# Patient Record
Sex: FEMALE | Race: WHITE | NOT HISPANIC OR LATINO | Employment: OTHER | URBAN - METROPOLITAN AREA
[De-identification: names, ages, dates, MRNs, and addresses within clinical notes are randomized per-mention and may not be internally consistent; named-entity substitution may affect disease eponyms.]

---

## 2017-02-07 ENCOUNTER — COMMUNICATION - HEALTHEAST (OUTPATIENT)
Dept: FAMILY MEDICINE | Facility: CLINIC | Age: 72
End: 2017-02-07

## 2017-02-07 DIAGNOSIS — F41.9 ANXIETY: ICD-10-CM

## 2017-05-17 ENCOUNTER — OFFICE VISIT - HEALTHEAST (OUTPATIENT)
Dept: FAMILY MEDICINE | Facility: CLINIC | Age: 72
End: 2017-05-17

## 2017-05-17 DIAGNOSIS — E03.9 HYPOTHYROIDISM: ICD-10-CM

## 2017-05-17 DIAGNOSIS — G47.00 INSOMNIA, UNSPECIFIED: ICD-10-CM

## 2017-05-17 DIAGNOSIS — F41.9 ANXIETY: ICD-10-CM

## 2017-05-17 DIAGNOSIS — R73.01 IMPAIRED FASTING GLUCOSE: ICD-10-CM

## 2017-05-17 LAB — HBA1C MFR BLD: 6.1 % (ref 3.5–6)

## 2017-05-17 ASSESSMENT — MIFFLIN-ST. JEOR: SCORE: 1105.6

## 2017-05-20 ENCOUNTER — COMMUNICATION - HEALTHEAST (OUTPATIENT)
Dept: FAMILY MEDICINE | Facility: CLINIC | Age: 72
End: 2017-05-20

## 2017-05-20 DIAGNOSIS — E03.9 HYPOTHYROIDISM: ICD-10-CM

## 2017-12-07 ENCOUNTER — AMBULATORY - HEALTHEAST (OUTPATIENT)
Dept: NURSING | Facility: CLINIC | Age: 72
End: 2017-12-07

## 2018-05-09 ENCOUNTER — OFFICE VISIT - HEALTHEAST (OUTPATIENT)
Dept: FAMILY MEDICINE | Facility: CLINIC | Age: 73
End: 2018-05-09

## 2018-05-09 DIAGNOSIS — Z12.11 SCREEN FOR COLON CANCER: ICD-10-CM

## 2018-05-09 DIAGNOSIS — N39.0 URINARY TRACT INFECTION WITHOUT HEMATURIA, SITE UNSPECIFIED: ICD-10-CM

## 2018-05-09 DIAGNOSIS — F41.9 ANXIETY: ICD-10-CM

## 2018-05-09 LAB
ALBUMIN UR-MCNC: NEGATIVE MG/DL
APPEARANCE UR: CLEAR
BACTERIA #/AREA URNS HPF: ABNORMAL HPF
BILIRUB UR QL STRIP: NEGATIVE
COLOR UR AUTO: YELLOW
GLUCOSE UR STRIP-MCNC: NEGATIVE MG/DL
HGB UR QL STRIP: ABNORMAL
KETONES UR STRIP-MCNC: NEGATIVE MG/DL
LEUKOCYTE ESTERASE UR QL STRIP: ABNORMAL
NITRATE UR QL: NEGATIVE
PH UR STRIP: 5.5 [PH] (ref 5–8)
RBC #/AREA URNS AUTO: ABNORMAL HPF
SP GR UR STRIP: 1.01 (ref 1–1.03)
SQUAMOUS #/AREA URNS AUTO: ABNORMAL LPF
UROBILINOGEN UR STRIP-ACNC: ABNORMAL
WBC #/AREA URNS AUTO: ABNORMAL HPF

## 2018-05-09 ASSESSMENT — MIFFLIN-ST. JEOR: SCORE: 1094.26

## 2018-05-10 LAB — BACTERIA SPEC CULT: NO GROWTH

## 2018-10-08 ENCOUNTER — RECORDS - HEALTHEAST (OUTPATIENT)
Dept: ADMINISTRATIVE | Facility: OTHER | Age: 73
End: 2018-10-08

## 2018-10-18 ENCOUNTER — RECORDS - HEALTHEAST (OUTPATIENT)
Dept: ADMINISTRATIVE | Facility: OTHER | Age: 73
End: 2018-10-18

## 2018-10-19 ENCOUNTER — RECORDS - HEALTHEAST (OUTPATIENT)
Dept: ADMINISTRATIVE | Facility: OTHER | Age: 73
End: 2018-10-19

## 2018-11-05 ENCOUNTER — AMBULATORY - HEALTHEAST (OUTPATIENT)
Dept: NURSING | Facility: CLINIC | Age: 73
End: 2018-11-05

## 2019-05-10 ENCOUNTER — RECORDS - HEALTHEAST (OUTPATIENT)
Dept: ADMINISTRATIVE | Facility: OTHER | Age: 74
End: 2019-05-10

## 2019-07-17 ENCOUNTER — OFFICE VISIT - HEALTHEAST (OUTPATIENT)
Dept: FAMILY MEDICINE | Facility: CLINIC | Age: 74
End: 2019-07-17

## 2019-07-17 DIAGNOSIS — Z78.0 ASYMPTOMATIC POSTMENOPAUSAL STATUS: ICD-10-CM

## 2019-07-17 DIAGNOSIS — G47.00 INSOMNIA, UNSPECIFIED TYPE: ICD-10-CM

## 2019-07-17 DIAGNOSIS — E03.9 HYPOTHYROIDISM, UNSPECIFIED TYPE: ICD-10-CM

## 2019-07-17 DIAGNOSIS — D12.6 SERRATED POLYPOSIS SYNDROME: ICD-10-CM

## 2019-07-17 DIAGNOSIS — R73.01 IMPAIRED FASTING GLUCOSE: ICD-10-CM

## 2019-07-17 DIAGNOSIS — Z01.818 PREOP GENERAL PHYSICAL EXAM: ICD-10-CM

## 2019-07-17 LAB
ERYTHROCYTE [DISTWIDTH] IN BLOOD BY AUTOMATED COUNT: 12.7 % (ref 11–14.5)
HBA1C MFR BLD: 6 % (ref 3.5–6)
HCT VFR BLD AUTO: 39.9 % (ref 35–47)
HGB BLD-MCNC: 13.3 G/DL (ref 12–16)
MCH RBC QN AUTO: 30.5 PG (ref 27–34)
MCHC RBC AUTO-ENTMCNC: 33.3 G/DL (ref 32–36)
MCV RBC AUTO: 91 FL (ref 80–100)
PLATELET # BLD AUTO: 278 THOU/UL (ref 140–440)
PMV BLD AUTO: 8.3 FL (ref 7–10)
RBC # BLD AUTO: 4.36 MILL/UL (ref 3.8–5.4)
TSH SERPL DL<=0.005 MIU/L-ACNC: 1.75 UIU/ML (ref 0.3–5)
WBC: 7.9 THOU/UL (ref 4–11)

## 2019-07-17 ASSESSMENT — MIFFLIN-ST. JEOR: SCORE: 1096.08

## 2019-07-18 LAB
ATRIAL RATE - MUSE: 75 BPM
DIASTOLIC BLOOD PRESSURE - MUSE: NORMAL MMHG
INTERPRETATION ECG - MUSE: NORMAL
P AXIS - MUSE: 59 DEGREES
PR INTERVAL - MUSE: 112 MS
QRS DURATION - MUSE: 80 MS
QT - MUSE: 366 MS
QTC - MUSE: 408 MS
R AXIS - MUSE: 41 DEGREES
SYSTOLIC BLOOD PRESSURE - MUSE: NORMAL MMHG
T AXIS - MUSE: 53 DEGREES
VENTRICULAR RATE- MUSE: 75 BPM

## 2019-07-31 ENCOUNTER — OFFICE VISIT - HEALTHEAST (OUTPATIENT)
Dept: FAMILY MEDICINE | Facility: CLINIC | Age: 74
End: 2019-07-31

## 2019-07-31 DIAGNOSIS — D12.6 SERRATED POLYPOSIS SYNDROME: ICD-10-CM

## 2019-07-31 DIAGNOSIS — R19.7 DIARRHEA, UNSPECIFIED TYPE: ICD-10-CM

## 2019-07-31 ASSESSMENT — MIFFLIN-ST. JEOR: SCORE: 1072.04

## 2019-09-30 ENCOUNTER — RECORDS - HEALTHEAST (OUTPATIENT)
Dept: BONE DENSITY | Facility: CLINIC | Age: 74
End: 2019-09-30

## 2019-09-30 ENCOUNTER — RECORDS - HEALTHEAST (OUTPATIENT)
Dept: ADMINISTRATIVE | Facility: OTHER | Age: 74
End: 2019-09-30

## 2019-09-30 DIAGNOSIS — Z78.0 ASYMPTOMATIC MENOPAUSAL STATE: ICD-10-CM

## 2019-10-10 ENCOUNTER — RECORDS - HEALTHEAST (OUTPATIENT)
Dept: ADMINISTRATIVE | Facility: OTHER | Age: 74
End: 2019-10-10

## 2020-01-08 ENCOUNTER — COMMUNICATION - HEALTHEAST (OUTPATIENT)
Dept: FAMILY MEDICINE | Facility: CLINIC | Age: 75
End: 2020-01-08

## 2020-08-06 ENCOUNTER — COMMUNICATION - HEALTHEAST (OUTPATIENT)
Dept: FAMILY MEDICINE | Facility: CLINIC | Age: 75
End: 2020-08-06

## 2020-08-06 DIAGNOSIS — E03.9 HYPOTHYROIDISM, UNSPECIFIED TYPE: ICD-10-CM

## 2020-08-10 ENCOUNTER — COMMUNICATION - HEALTHEAST (OUTPATIENT)
Dept: FAMILY MEDICINE | Facility: CLINIC | Age: 75
End: 2020-08-10

## 2020-08-20 ENCOUNTER — RECORDS - HEALTHEAST (OUTPATIENT)
Dept: ADMINISTRATIVE | Facility: OTHER | Age: 75
End: 2020-08-20

## 2020-10-12 ENCOUNTER — RECORDS - HEALTHEAST (OUTPATIENT)
Dept: ADMINISTRATIVE | Facility: OTHER | Age: 75
End: 2020-10-12

## 2020-10-13 ENCOUNTER — OFFICE VISIT - HEALTHEAST (OUTPATIENT)
Dept: FAMILY MEDICINE | Facility: CLINIC | Age: 75
End: 2020-10-13

## 2020-10-13 DIAGNOSIS — G47.00 INSOMNIA, UNSPECIFIED TYPE: ICD-10-CM

## 2020-10-13 DIAGNOSIS — E03.9 HYPOTHYROIDISM, UNSPECIFIED TYPE: ICD-10-CM

## 2020-10-13 RX ORDER — LEVOTHYROXINE SODIUM 100 UG/1
100 TABLET ORAL DAILY
Qty: 90 TABLET | Refills: 4 | Status: SHIPPED | OUTPATIENT
Start: 2020-10-13 | End: 2021-07-28

## 2020-10-13 RX ORDER — DIAZEPAM 2 MG
TABLET ORAL
Qty: 30 TABLET | Refills: 1 | Status: SHIPPED | OUTPATIENT
Start: 2020-10-13 | End: 2021-10-27

## 2020-10-19 ENCOUNTER — AMBULATORY - HEALTHEAST (OUTPATIENT)
Dept: LAB | Facility: CLINIC | Age: 75
End: 2020-10-19

## 2020-10-19 DIAGNOSIS — E03.9 HYPOTHYROIDISM, UNSPECIFIED TYPE: ICD-10-CM

## 2020-10-19 LAB — TSH SERPL DL<=0.005 MIU/L-ACNC: 1.53 UIU/ML (ref 0.3–5)

## 2021-02-11 ENCOUNTER — AMBULATORY - HEALTHEAST (OUTPATIENT)
Dept: NURSING | Facility: CLINIC | Age: 76
End: 2021-02-11

## 2021-03-04 ENCOUNTER — AMBULATORY - HEALTHEAST (OUTPATIENT)
Dept: NURSING | Facility: CLINIC | Age: 76
End: 2021-03-04

## 2021-03-17 ENCOUNTER — RECORDS - HEALTHEAST (OUTPATIENT)
Dept: ADMINISTRATIVE | Facility: OTHER | Age: 76
End: 2021-03-17

## 2021-05-30 VITALS — WEIGHT: 141 LBS | HEIGHT: 64 IN | BODY MASS INDEX: 24.07 KG/M2

## 2021-05-30 NOTE — PROGRESS NOTES
Preoperative Exam    Scheduled Procedure: Right Hemicolectomy   Surgery Date:  7/25/19  Surgery Location: [facility] Fax# [phone]    Surgeon:  Dr PARIS Park    Assessment/Plan:     1. Preop general physical exam  Surgical risks include mild dyslipidemia that does not require medical treatment and impaired fasting glucose.  At this time she has no conditions that require further evaluation.  She may proceed with surgery as scheduled without further clinical clarification or evaluation and may proceed with choice of anesthesia.  - Electrocardiogram Perform and Read  - HM2(CBC w/o Differential)    2. Serrated polyposis syndrome  To proceed with hemicolectomy as scheduled.    3. Impaired fasting glucose  Encourage continued efforts at healthy lifestyle habits.  Will check A1c today.  She is not fasting.  - Glycosylated Hemoglobin A1c    4. Hypothyroidism, unspecified type  Continue levothyroxine at current dose including day of surgery.  Will check thyroid cascade today.  - levothyroxine (SYNTHROID, LEVOTHROID) 100 MCG tablet; Take 1 tablet (100 mcg total) by mouth Daily at 6:00 am.  Dispense: 90 tablet; Refill: 4  - Thyroid [place]    5. Insomnia, unspecified type  Encouraged continued sleep hygiene.  May use Unisom or diazepam very sparingly, not to combine the 2.  - diazePAM (VALIUM) 2 MG tablet; TAKE ONE TABLET BY MOUTH AT BEDTIME AS NEEDED FOR ANXIETY  Dispense: 30 tablet; Refill: 1     Surgical Procedure Risk: Low (reported cardiac risk generally < 1%)  Have you had prior anesthesia?: Yes  Have you or any family members had a previous anesthesia reaction:  Yes: years ago had a hard time waking up  Do you or any family members have a history of a clotting or bleeding disorder?: No  Cardiac Risk Assessment: no increased risk for major cardiac complications    APPROVAL GIVEN to proceed with proposed procedure, without further diagnostic evaluation    Functional Status: Independent  Patient plans to recover at home  with family.     Subjective:      Nithya Manuel is a 74 y.o. female who presents for a preoperative consultation.  She unfortunately has developed multiple sessile serrated adenomas on 2 colonoscopies within the last year, some of which were unable to be resected in her right colon due to location.  She has since been diagnosed with serrated polyposis syndrome.  Requiring right hemicolectomy.    History of hypothyroidism for which she remains on levothyroxine and clinically is euthyroid, due for thyroid check.  She has a history of impaired fasting glucose with mild elevation in A1c, due for follow-up.  History of mild anxiety and associated insomnia, has diazepam available which she uses very sparingly.  History of mild elevation in lipids which has not required treatment.  She has a history of right ovarian cystectomy, was noted to have many adhesions at the time of that laparoscopic surgery of the laparoscopy was able to be completed.    All other systems reviewed and are negative, other than those listed in the HPI.    Pertinent History  Do you have difficulty breathing or chest pain after walking up a flight of stairs: No  History of obstructive sleep apnea: No  Steroid use in the last 6 months: No  Frequent Aspirin/NSAID use: Yes: aspirin 81mg daily  Prior Blood Transfusion: No  Prior Blood Transfusion Reaction: No  If for some reason prior to, during or after the procedure, if it is medically indicated, would you be willing to have a blood transfusion?:  There is no transfusion refusal.    Current Outpatient Medications   Medication Sig Dispense Refill     diazePAM (VALIUM) 2 MG tablet TAKE ONE TABLET BY MOUTH AT BEDTIME AS NEEDED FOR ANXIETY 30 tablet 1     levothyroxine (SYNTHROID, LEVOTHROID) 100 MCG tablet Take 1 tablet (100 mcg total) by mouth Daily at 6:00 am. 90 tablet 4     No current facility-administered medications for this visit.         Allergies   Allergen Reactions     Penicillins Hives      Sulfa (Sulfonamide Antibiotics) Other (See Comments)     Not sure if its her or her son.       Patient Active Problem List   Diagnosis     Insomnia     Impaired Fasting Glucose     Hypothyroidism     Dyslipidemia     Serrated polyposis syndrome       No past medical history on file.    Past Surgical History:   Procedure Laterality Date     EXPLORATORY LAPAROTOMY      Due to infertility     OVARIAN CYST SURGERY Right 12/26/2013    [facility]     TUBAL LIGATION         Social History     Socioeconomic History     Marital status:      Spouse name: Not on file     Number of children: Not on file     Years of education: Not on file     Highest education level: Not on file   Occupational History     Not on file   Social Needs     Financial resource strain: Not on file     Food insecurity:     Worry: Not on file     Inability: Not on file     Transportation needs:     Medical: Not on file     Non-medical: Not on file   Tobacco Use     Smoking status: Never Smoker     Smokeless tobacco: Never Used   Substance and Sexual Activity     Alcohol use: Yes     Alcohol/week: 1.8 oz     Types: 3 Glasses of wine per week     Drug use: No     Sexual activity: Not on file   Lifestyle     Physical activity:     Days per week: Not on file     Minutes per session: Not on file     Stress: Not on file   Relationships     Social connections:     Talks on phone: Not on file     Gets together: Not on file     Attends Religion service: Not on file     Active member of club or organization: Not on file     Attends meetings of clubs or organizations: Not on file     Relationship status: Not on file     Intimate partner violence:     Fear of current or ex partner: Not on file     Emotionally abused: Not on file     Physically abused: Not on file     Forced sexual activity: Not on file   Other Topics Concern     Not on file   Social History Narrative     Not on file       Patient Care Team:  Kristine Bridges MD as PCP -  "General          Objective:     Vitals:    07/17/19 0941   BP: 138/58   Pulse: 84   Resp: 16   Temp: 98.3  F (36.8  C)   TempSrc: Oral   SpO2: 99%   Weight: 138 lb 14.4 oz (63 kg)   Height: 5' 3.75\" (1.619 m)         Physical Exam:  Physical Examination: General appearance - alert, well appearing, and in no distress, oriented to person, place, and time and normal appearing weight  Mental status - alert, oriented to person, place, and time, normal mood, behavior, speech, dress, motor activity, and thought processes  Eyes - pupils equal and reactive, extraocular eye movements intact  Ears - bilateral TM's and external ear canals normal  Nose - normal and patent, no erythema, discharge or polyps  Mouth - mucous membranes moist, pharynx normal without lesions  Neck - supple, no significant adenopathy  Lymphatics - no palpable lymphadenopathy, no hepatosplenomegaly  Chest - clear to auscultation, no wheezes, rales or rhonchi, symmetric air entry  Heart - normal rate, regular rhythm, normal S1, S2, no murmurs, rubs, clicks or gallops  Abdomen - soft, nontender, nondistended, no masses or organomegaly  Neurological - alert, oriented, normal speech, no focal findings or movement disorder noted  Musculoskeletal - no joint tenderness, deformity or swelling  Extremities - peripheral pulses normal, no pedal edema, no clubbing or cyanosis  Skin - normal coloration and turgor, no rashes, no suspicious skin lesions noted      Patient Instructions     Hold all supplements, aspirin and NSAIDs for 7 days prior to surgery.  Follow your surgeon's direction on when to stop eating and drinking prior to surgery.  Your surgeon will be managing your pain after your surgery.    Remove all jewelry and metal piercings before your surgery.   Remove nail polish from fingers before surgery.    I ordered and personally reviewed a 12-lead EKG which reveals normal sinus rhythm, no ischemia.    Labs:  Labs pending at this time.  Results will be " reviewed when available.    Immunization History   Administered Date(s) Administered     Influenza high dose, seasonal 11/03/2015, 10/17/2016, 12/07/2017, 11/05/2018     Influenza, Seasonal, Inj PF IIV3 12/08/2009, 10/21/2010, 12/01/2011, 11/01/2012, 09/30/2013     Influenza, seasonal,quad inj 6-35 mos 11/01/2008, 12/08/2009, 10/20/2014     Influenza,seasonal, Inj IIV3 11/01/2008, 10/20/2014     Pneumo Conj 13-V (2010&after) 02/26/2015     Pneumo Polysac 23-V 12/08/2009     Td, Adult, Absorbed 04/06/2005     Td,adult,historic,unspecified 04/06/2005     Tdap 12/05/2014     ZOSTER, LIVE 02/04/2016           Electronically signed by Kristine Bridges MD 07/17/19 9:33 AM

## 2021-05-31 ENCOUNTER — RECORDS - HEALTHEAST (OUTPATIENT)
Dept: ADMINISTRATIVE | Facility: CLINIC | Age: 76
End: 2021-05-31

## 2021-05-31 NOTE — PROGRESS NOTES
Assessment/Plan:     1. Serrated polyposis syndrome  Doing quite well postoperatively.  She is a call out to her surgeon in regards to the loose stools.  No indications of underlying infectious process or other complication.  I suspect this is functional and partly related to her lack of need for narcotic pain medications.  Advised compliance with diet as recommended.    2. Diarrhea, unspecified type  As above.      There are no Patient Instructions on file for this visit.     Return in about 6 months (around 1/31/2020) for Annual Wellness Visit.      Subjective:      Nithya Manuel is a 74 y.o. female presented to clinic today for follow-up of hospitalization at [facility] July 25 through July 28, 2019.  She was admitted for planned right colon latrell-resection due to serrated polyposis syndrome.  She did well operatively and postoperatively.  Since being home, pain is been well controlled with Tylenol alone, has not needed any oxycodone.  She has not been taking ibuprofen.  Noting loose stools that are yellowish in color, no mucus or blood.  She is feeling well with only little cramping with the loose stools but no fevers, chills, or other changes.  Good appetite.  We reviewed pathology showing serrated sessile adenomas but no cancers.    Post Discharge Medication Reconciliation Status: discharge medications reconciled, continue medications without change     Current Outpatient Medications   Medication Sig Dispense Refill     diazePAM (VALIUM) 2 MG tablet TAKE ONE TABLET BY MOUTH AT BEDTIME AS NEEDED FOR ANXIETY 30 tablet 1     levothyroxine (SYNTHROID, LEVOTHROID) 100 MCG tablet Take 1 tablet (100 mcg total) by mouth Daily at 6:00 am. 90 tablet 4     No current facility-administered medications for this visit.        Past Medical History, Family History, and Social History reviewed.  No past medical history on file.  Past Surgical History:   Procedure Laterality Date     EXPLORATORY LAPAROTOMY   "    Due to infertility     OVARIAN CYST SURGERY Right 12/26/2013    [facility]     TUBAL LIGATION       Penicillins and Sulfa (sulfonamide antibiotics)  No family history on file.  Social History     Socioeconomic History     Marital status:      Spouse name: Not on file     Number of children: Not on file     Years of education: Not on file     Highest education level: Not on file   Occupational History     Not on file   Social Needs     Financial resource strain: Not on file     Food insecurity:     Worry: Not on file     Inability: Not on file     Transportation needs:     Medical: Not on file     Non-medical: Not on file   Tobacco Use     Smoking status: Never Smoker     Smokeless tobacco: Never Used   Substance and Sexual Activity     Alcohol use: Yes     Alcohol/week: 1.8 oz     Types: 3 Glasses of wine per week     Drug use: No     Sexual activity: Not on file   Lifestyle     Physical activity:     Days per week: Not on file     Minutes per session: Not on file     Stress: Not on file   Relationships     Social connections:     Talks on phone: Not on file     Gets together: Not on file     Attends Adventism service: Not on file     Active member of club or organization: Not on file     Attends meetings of clubs or organizations: Not on file     Relationship status: Not on file     Intimate partner violence:     Fear of current or ex partner: Not on file     Emotionally abused: Not on file     Physically abused: Not on file     Forced sexual activity: Not on file   Other Topics Concern     Not on file   Social History Narrative     Not on file         Review of systems is as stated in HPI, and the remainder of the 10 system review is otherwise negative.    Objective:     Vitals:    07/31/19 1549   BP: 108/74   Pulse: 82   Resp: 16   Temp: 98.1  F (36.7  C)   TempSrc: Oral   SpO2: 97%   Weight: 133 lb 9.6 oz (60.6 kg)   Height: 5' 3.75\" (1.619 m)    Body mass index is 23.11 kg/m .    Alert female, " tearful in discussing pet.  Heart with regular rate and rhythm.  Lungs clear.  Abdomen is soft.  Nontender.  Ambulates without difficulty.    Specimen:    Colon, Right colon                                                                        Final Diagnosis A) COLON, TERMINAL ILEUM AND APPENDIX; RIGHT HEMICOLECTOMY:  1. Sessile serrated adenomas (3):     a. Proximal ascending colon, 0.7 cm, associated with         polypectomy site     b. Distal ascending/hepatic flexure, 1 cm     c. Hepatic flexure/proximal transverse, 1.2 cm  2. Separate polypectomy site, mid ascending  3. Negative for overt cytologic dysplasia  4. Margins negative for adenoma  5. Background colon, ileum and appendix are normal  6. Incidental benign mesothelial inclusion, cecum         This note has been dictated using voice recognition software. Any grammatical or context distortions are unintentional and inherent to the the software.

## 2021-06-01 VITALS — WEIGHT: 138.5 LBS | BODY MASS INDEX: 23.64 KG/M2 | HEIGHT: 64 IN

## 2021-06-03 VITALS — WEIGHT: 133.6 LBS | BODY MASS INDEX: 22.81 KG/M2 | HEIGHT: 64 IN

## 2021-06-03 VITALS — WEIGHT: 138.9 LBS | HEIGHT: 64 IN | BODY MASS INDEX: 23.71 KG/M2

## 2021-06-10 NOTE — TELEPHONE ENCOUNTER
RN cannot approve Refill Request    RN can NOT refill this medication Protocol failed and NO refill given. Last office visit: 7/31/2019 Kristine Bridges MD Last Physical: 7/17/2019 Last MTM visit: Visit date not found Last visit same specialty: 7/31/2019 Kristine Bridges MD.  Next visit within 3 mo: Visit date not found  Next physical within 3 mo: Visit date not found      Nithya Davis, [facility] Triage/Med Refill 8/6/2020    Requested Prescriptions   Pending Prescriptions Disp Refills     levothyroxine (SYNTHROID, LEVOTHROID) 100 MCG tablet [Pharmacy Med Name: LEVOTHYROXINE 100 MCG TABLET] 90 tablet 4     Sig: TAKE 1 TABLET (100 MCG TOTAL) BY MOUTH DAILY AT 6:00 AM.       Thyroid Hormones Protocol Failed - 8/6/2020 10:24 AM        Failed - Provider visit in past 12 months or next 3 months     Last office visit with prescriber/PCP: 7/31/2019 Kristine Bridges MD OR same dept: Visit date not found OR same specialty: 7/31/2019 Kristine Bridges MD  Last physical: 7/17/2019 Last MTM visit: Visit date not found   Next visit within 3 mo: Visit date not found  Next physical within 3 mo: Visit date not found  Prescriber OR PCP: Kristine Bridges MD  Last diagnosis associated with med order: 1. Hypothyroidism, unspecified type  - levothyroxine (SYNTHROID, LEVOTHROID) 100 MCG tablet [Pharmacy Med Name: LEVOTHYROXINE 100 MCG TABLET]; Take 1 tablet (100 mcg total) by mouth Daily at 6:00 am.  Dispense: 90 tablet; Refill: 4    If protocol passes may refill for 12 months if within 3 months of last provider visit (or a total of 15 months).             Failed - TSH on file in past 12 months for patient age 12 & older     TSH   Date Value Ref Range Status   07/17/2019 1.75 0.30 - 5.00 uIU/mL Final

## 2021-06-12 NOTE — PROGRESS NOTES
"Nithya Manuel is a 75 y.o. female who is being evaluated via a billable telephone visit.      The patient has been notified of following:     \"This telephone visit will be conducted via a call between you and your physician/provider. We have found that certain health care needs can be provided without the need for a physical exam.  This service lets us provide the care you need with a short phone conversation.  If a prescription is necessary we can send it directly to your pharmacy.  If lab work is needed we can place an order for that and you can then stop by our lab to have the test done at a later time.    Telephone visits are billed at different rates depending on your insurance coverage. During this emergency period, for some insurers they may be billed the same as an in-person visit.  Please reach out to your insurance provider with any questions.    If during the course of the call the physician/provider feels a telephone visit is not appropriate, you will not be charged for this service.\"    Patient has given verbal consent to a Telephone visit? Yes    What phone number would you like to be contacted at? [phone]    Patient would like to receive their AVS by AVS Preference: Del.    Assessment/Plan:    1. Insomnia, unspecified type  Insomnia, chronic and stable with sparing use of diazepam.  She will continue use on an as needed.  We discussed notifying us if she is needing to take more than once every few weeks.  Refill provided today.  She is due for annual exam and routine healthcare maintenance.  I encouraged her to schedule this within the next few months.  - diazePAM (VALIUM) 2 MG tablet; TAKE ONE TABLET BY MOUTH AT BEDTIME AS NEEDED FOR ANXIETY  Dispense: 30 tablet; Refill: 1    2. Hypothyroidism, unspecified type  Reviewed history of hypothyroidism, remains on Synthroid 100 MCG daily.  Patient will schedule lab only appointment to have thyroid level checked within the month.  Will notify " patient if dosing needs to be adjusted at that time.    - levothyroxine (SYNTHROID, LEVOTHROID) 100 MCG tablet; Take 1 tablet (100 mcg total) by mouth Daily at 6:00 am.  Dispense: 90 tablet; Refill: 4  - Thyroid cascade, FUTURE      Subjective:    Nithya Manuel is a 75-year-old female here today for telephone visit for medication check.  Patient has amnesia for which she uses diazepam 2 mg as needed.  Typically only having to use once a week or once every few weeks.  Typically reserves it for times of increased anxiety contributing to her insomnia.  She has had some increased stress related to COVID pandemic and almost losing her son months.  She finds that diazepam helps her relax enough to sleep at night.  She is not needing to use it more often than typically.  She is not mixing with any other sedatives.  She also has hypothyroidism, remains on levothyroxine 100 MCG daily for management.  She does not have any new concerns in this regard today.  Denies having any symptoms of hypo-/hyperthyroidism.  She is tolerating the medication well.  She is due for routine lab work and annual exam. Review of systems is as stated in HPI, and the remainder of the 10 system review is otherwise unremarkable.    Past Medical History, Family History, and Social History reviewed.    Past Surgical History:   Procedure Laterality Date     EXPLORATORY LAPAROTOMY      Due to infertility     OVARIAN CYST SURGERY Right 12/26/2013    [facility]     TUBAL LIGATION          No family history on file.     History reviewed. No pertinent past medical history.     Social History     Tobacco Use     Smoking status: Never Smoker     Smokeless tobacco: Never Used   Substance Use Topics     Alcohol use: Yes     Alcohol/week: 3.0 standard drinks     Types: 3 Glasses of wine per week     Drug use: No        Current Outpatient Medications   Medication Sig Dispense Refill     diazePAM (VALIUM) 2 MG tablet TAKE ONE TABLET BY MOUTH AT BEDTIME AS  NEEDED FOR ANXIETY 30 tablet 1     levothyroxine (SYNTHROID, LEVOTHROID) 100 MCG tablet Take 1 tablet (100 mcg total) by mouth Daily at 6:00 am. 90 tablet 4     No current facility-administered medications for this visit.           Objective:    There were no vitals filed for this visit.   There is no height or weight on file to calculate BMI.      General:   Patient is pleasant and cooperative over the phone.  Coughing or shortness of breath noted.         This note has been dictated using voice recognition software. Any grammatical or context distortions are unintentional and inherent to the use of this software.         Phone call duration:  6 minutes    Kaci Sal, CNP

## 2021-06-14 NOTE — PROGRESS NOTES
Chief Complaint   Patient presents with     Flu Vaccine     Flu consent and contraindication forms are given/ signed/ reviewed and sent to medical records to scan.     Raine Ruiz CMA [facility] 12/7/2017 2:10 PM

## 2021-06-16 PROBLEM — D12.6 SERRATED POLYPOSIS SYNDROME: Status: ACTIVE | Noted: 2019-07-17

## 2021-06-17 NOTE — PROGRESS NOTES
Assessment/Plan:     1. Urinary tract infection without hematuria, site unspecified  Reviewed nature of condition.  May take Azo as needed for symptoms.  Prescription provided for nitrofurantoin, discussed common side effects and anticipated course of resolution.  Will await urine culture.  Notify with onset of additional symptoms or lack of resolution.  - Urinalysis-UC if Indicated  - Culture, Urine    2. Anxiety  Doing well, refill provided of diazepam.  - diazePAM (VALIUM) 2 MG tablet; TAKE ONE TABLET BY MOUTH AT BEDTIME AS NEEDED FOR ANXIETY  Dispense: 30 tablet; Refill: 1    3. Screen for colon cancer  Referral placed for colonoscopy.  - Ambulatory referral for Colonoscopy      Subjective:      Nithya Manuel is a 73 y.o. female presenting to clinic today for evaluation of urinary frequency, burning, and stinging for the past 1-2 days.  She has had some urgency.  Occasionally will have leakage.  No blood in her urine.  Intermittent pressure in her pelvis.  Denies any back pain, fevers, chills, or overall sense of illness.  She remains physically active.  No prior history of recurrent UTI.    Current Outpatient Prescriptions   Medication Sig Dispense Refill     calcium, as carbonate, (TUMS) 200 mg calcium (500 mg) chewable tablet Chew 1 tablet daily.       diazePAM (VALIUM) 2 MG tablet TAKE ONE TABLET BY MOUTH AT BEDTIME AS NEEDED FOR ANXIETY 30 tablet 1     levothyroxine (SYNTHROID, LEVOTHROID) 100 MCG tablet Take 1 tablet (100 mcg total) by mouth Daily at 6:00 am. 90 tablet 4     nitrofurantoin, macrocrystal-monohydrate, (MACROBID) 100 MG capsule Take 1 capsule (100 mg total) by mouth 2 (two) times a day for 5 days. 10 capsule 0     No current facility-administered medications for this visit.        Past Medical History, Family History, and Social History reviewed.  No past medical history on file.  No past surgical history on file.  Penicillins  No family history on file.  Social History     Social History  "    Marital status:      Spouse name: N/A     Number of children: N/A     Years of education: N/A     Occupational History     Not on file.     Social History Main Topics     Smoking status: Never Smoker     Smokeless tobacco: Never Used     Alcohol use 1.8 oz/week     3 Glasses of wine per week     Drug use: No     Sexual activity: Not on file     Other Topics Concern     Not on file     Social History Narrative         Review of systems is as stated in HPI, and the remainder of the 10 system review is otherwise negative.    Objective:     Vitals:    05/09/18 1102   BP: 120/62   Patient Site: Right Arm   Patient Position: Sitting   Cuff Size: Adult Regular   Pulse: 76   Resp: 16   Temp: 98.5  F (36.9  C)   TempSrc: Oral   SpO2: 98%   Weight: 138 lb 8 oz (62.8 kg)   Height: 5' 3.75\" (1.619 m)    Body mass index is 23.96 kg/(m^2).    Alert female, no acute distress, nontoxic in appearance.  No CVA tenderness.  Abdomen is soft, nontender, nondistended.    Office Visit on 05/09/2018   Component Date Value Ref Range Status     Color, UA 05/09/2018 Yellow  Colorless, Yellow, Straw, Light Yellow Final     Clarity, UA 05/09/2018 Clear  Clear Final     Glucose, UA 05/09/2018 Negative  Negative Final     Bilirubin, UA 05/09/2018 Negative  Negative Final     Ketones, UA 05/09/2018 Negative  Negative Final     Specific Gravity, UA 05/09/2018 1.010  1.005 - 1.030 Final     Blood, UA 05/09/2018 Large* Negative Final     pH, UA 05/09/2018 5.5  5.0 - 8.0 Final     Protein, UA 05/09/2018 Negative  Negative mg/dL Final     Urobilinogen, UA 05/09/2018 0.2 E.U./dL  0.2 E.U./dL, 1.0 E.U./dL Final     Nitrite, UA 05/09/2018 Negative  Negative Final     Leukocytes, UA 05/09/2018 Moderate* Negative Final     Bacteria, UA 05/09/2018 Few* None Seen hpf Final     RBC, UA 05/09/2018 0-2  None Seen, 0-2 hpf Final     WBC, UA 05/09/2018 5-10* None Seen, 0-5 hpf Final     Squam Epithel, UA 05/09/2018 0-5  None Seen, 0-5 lpf Final    "     This note has been dictated using voice recognition software. Any grammatical or context distortions are unintentional and inherent to the the software.

## 2021-06-20 NOTE — LETTER
Letter by Kristine Bridges MD at      Author: Kristine Bridges MD Service: -- Author Type: --    Filed:  Encounter Date: 8/10/2020 Status: (Other)         Nithya Manuel  [address] [facility] [address]      August 10, 2020      Dear Nithya Manuel,    Per our refill protocol, you are due for a medication check office visit. Your prescription for LEVOTHROXINE was sent to your pharmacy ([id number]). Please call [phone] to schedule an AWV FACE  TO FACE OR VIRTUAL VISIT with  before your next refill is needed to avoid delays.    Thank you,  [facility]

## 2021-07-02 ENCOUNTER — RECORDS - HEALTHEAST (OUTPATIENT)
Dept: FAMILY MEDICINE | Facility: CLINIC | Age: 76
End: 2021-07-02

## 2021-07-04 NOTE — TELEPHONE ENCOUNTER
Telephone Encounter by June Hernandez CMA at 7/2/2021 11:05 AM     Author: June Hernandez CMA Service: -- Author Type: Certified Medical Assistant    Filed: 7/2/2021 11:06 AM Encounter Date: 7/2/2021 Status: Signed    : June Hernandez CMA (Certified Medical Assistant)       lmtcb- pt is due for annual wellness physical. Please schedule with Kaci Sal if pt wants to be seen

## 2021-07-18 ENCOUNTER — HEALTH MAINTENANCE LETTER (OUTPATIENT)
Age: 76
End: 2021-07-18

## 2021-07-28 ENCOUNTER — VIRTUAL VISIT (OUTPATIENT)
Dept: FAMILY MEDICINE | Facility: CLINIC | Age: 76
End: 2021-07-28
Payer: COMMERCIAL

## 2021-07-28 DIAGNOSIS — E03.9 HYPOTHYROIDISM, UNSPECIFIED TYPE: Primary | ICD-10-CM

## 2021-07-28 DIAGNOSIS — Z13.220 LIPID SCREENING: ICD-10-CM

## 2021-07-28 DIAGNOSIS — G47.00 INSOMNIA, UNSPECIFIED TYPE: ICD-10-CM

## 2021-07-28 DIAGNOSIS — R73.01 IMPAIRED FASTING GLUCOSE: ICD-10-CM

## 2021-07-28 PROCEDURE — 99214 OFFICE O/P EST MOD 30 MIN: CPT | Mod: 95 | Performed by: FAMILY MEDICINE

## 2021-07-28 RX ORDER — DIAZEPAM 2 MG
TABLET ORAL
Qty: 30 TABLET | Refills: 1 | Status: CANCELLED | OUTPATIENT
Start: 2021-07-28

## 2021-07-28 RX ORDER — LEVOTHYROXINE SODIUM 100 UG/1
100 TABLET ORAL DAILY
Qty: 90 TABLET | Refills: 1 | Status: SHIPPED | OUTPATIENT
Start: 2021-07-28 | End: 2021-10-27

## 2021-07-28 NOTE — PROGRESS NOTES
Vivian is a 76 year old who is being evaluated via a billable telephone visit.      What phone number would you like to be contacted at? [phone]  How would you like to obtain your AVS? Ethanhart    Assessment & Plan     Hypothyroidism, unspecified type  Clinic clinically euthyroid and doing well with levothyroxine.  Continue.  She will be due to have her TSH checked in 2 to 3 months, we will plan to do so at her annual wellness visit.  - levothyroxine (SYNTHROID/LEVOTHROID) 100 MCG tablet; Take 1 tablet (100 mcg) by mouth daily  - **TSH with free T4 reflex FUTURE 2mo; Future    Insomnia, unspecified type  Encouraged continued healthy lifestyle habits.  May continue to use diazepam very sparingly as she has been doing.  Notify me with onset additional symptoms or worsening.  She is not needing refill of diazepam today, but I am very comfortable in refilling it upon her request.    Lipid screening  We will plan to obtain future fasting lipids.  Order placed.  - Lipid panel reflex to direct LDL Fasting; Future    Impaired fasting glucose  We will plan to obtain future fasting glucose and A1c to reassess diabetes risk.  This will likely be done at her future annual wellness visit.  - **Glucose FUTURE 2mo; Future  - Hemoglobin A1c; Future           There are no Patient Instructions on file for this visit.    Return in about 2 months (around 9/28/2021) for Annual wellness visit.  Of note, patient is very concerned about potential for additional cost at annual wellness visit.  She prefers to not address her chronic medical conditions at that time as we have addressed them today.    Kristine Bridges MD  [facility]    Katey Park is a 76 year old who presents for the following health issues by phone visit.    History of hypothyroidism for which she is taking levothyroxine daily and tolerating well.  Feels that she is in good balance overall.  She had a normal TSH October 2020.  History of  intermittent insomnia with early awakening, usually triggered by stress.  Notes that this past year her son had severe illness and near death related the pandemic, that created more sleeping difficulties but overall is doing well.  Diazepam works very well if she has early awakening, usually takes less than 1 full pill, and has no daytime grogginess after.  Previous history of impaired fasting glucose is due for follow-up.  She reports that she has been having difficulty with sciatic nerve pain and is seeing a chiropractor and improving.  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      Review of Systems      Objective           Vitals:  No vitals were obtained today due to virtual visit.    Physical Exam   Alert and pleasant female.  Able to speak in full sentences without respiratory distress, cough, or wheeze.  Appropriate affect.          Phone call duration: 9 minutes

## 2021-09-12 ENCOUNTER — HEALTH MAINTENANCE LETTER (OUTPATIENT)
Age: 76
End: 2021-09-12

## 2021-10-18 ENCOUNTER — TRANSFERRED RECORDS (OUTPATIENT)
Dept: HEALTH INFORMATION MANAGEMENT | Facility: CLINIC | Age: 76
End: 2021-10-18

## 2021-10-26 PROBLEM — D12.6 ADENOMATOUS COLON POLYP: Status: RESOLVED | Noted: 2019-07-25 | Resolved: 2021-10-26

## 2021-10-26 PROBLEM — D12.6 ADENOMATOUS COLON POLYP: Status: ACTIVE | Noted: 2019-07-25

## 2021-10-27 ENCOUNTER — OFFICE VISIT (OUTPATIENT)
Dept: FAMILY MEDICINE | Facility: CLINIC | Age: 76
End: 2021-10-27
Payer: COMMERCIAL

## 2021-10-27 VITALS
RESPIRATION RATE: 16 BRPM | HEIGHT: 64 IN | HEART RATE: 72 BPM | OXYGEN SATURATION: 99 % | WEIGHT: 139.8 LBS | TEMPERATURE: 97.7 F | BODY MASS INDEX: 23.87 KG/M2 | SYSTOLIC BLOOD PRESSURE: 118 MMHG | DIASTOLIC BLOOD PRESSURE: 82 MMHG

## 2021-10-27 DIAGNOSIS — E78.5 DYSLIPIDEMIA: ICD-10-CM

## 2021-10-27 DIAGNOSIS — Z78.0 ASYMPTOMATIC POSTMENOPAUSAL STATUS: ICD-10-CM

## 2021-10-27 DIAGNOSIS — Z00.00 MEDICARE ANNUAL WELLNESS VISIT, SUBSEQUENT: Primary | ICD-10-CM

## 2021-10-27 DIAGNOSIS — R73.01 IMPAIRED FASTING GLUCOSE: ICD-10-CM

## 2021-10-27 DIAGNOSIS — F51.01 PRIMARY INSOMNIA: ICD-10-CM

## 2021-10-27 DIAGNOSIS — D12.6 SERRATED POLYPOSIS SYNDROME: ICD-10-CM

## 2021-10-27 DIAGNOSIS — M85.80 OSTEOPENIA, UNSPECIFIED LOCATION: ICD-10-CM

## 2021-10-27 DIAGNOSIS — E06.3 HYPOTHYROIDISM DUE TO HASHIMOTO'S THYROIDITIS: ICD-10-CM

## 2021-10-27 PROCEDURE — 99397 PER PM REEVAL EST PAT 65+ YR: CPT | Performed by: FAMILY MEDICINE

## 2021-10-27 RX ORDER — LEVOTHYROXINE SODIUM 100 UG/1
100 TABLET ORAL DAILY
Qty: 90 TABLET | Refills: 4 | Status: SHIPPED | OUTPATIENT
Start: 2021-10-27 | End: 2022-10-27

## 2021-10-27 RX ORDER — DIAZEPAM 2 MG
2 TABLET ORAL
Qty: 30 TABLET | Refills: 0 | Status: SHIPPED | OUTPATIENT
Start: 2021-10-27 | End: 2022-05-02

## 2021-10-27 ASSESSMENT — MIFFLIN-ST. JEOR: SCORE: 1100.16

## 2021-10-27 ASSESSMENT — ACTIVITIES OF DAILY LIVING (ADL): CURRENT_FUNCTION: NO ASSISTANCE NEEDED

## 2021-10-27 NOTE — PROGRESS NOTES
"SUBJECTIVE:   Nithya Manuel is a 76 year old female who presents for Preventive Visit.      Patient has been advised of split billing requirements and indicates understanding: Yes   Are you in the first 12 months of your Medicare coverage?  No    Been doing quite well over the past year without changes in her health.  Continues levothyroxine and is feeling in good balance.  She has a fasting lab visit tomorrow to follow-up on dyslipidemia and impaired fasting glucose.  Exercising regularly by walking and for the most part eating a healthy diet though sometimes having sweets such as chocolate at times.  Has some intermittent struggles with insomnia, usually with early awakening.  This responds to taking a fraction of a diazepam tablet which works well for her.  She notes no side effects.  She has failed other over-the-counter and prescription medications in the past.  She is near due for bone density scan.  Describes a mammogram performed just a few weeks ago through [facility].  She has an advance healthcare directive.  She is going to be receiving her flu vaccine and third dose of the Covid Pfizer vaccine next week.    Healthy Habits:     In general, how would you rate your overall health?  Excellent    Frequency of exercise:  6-7 days/week    Duration of exercise:  15-30 minutes    Do you usually eat at least 4 servings of fruit and vegetables a day, include whole grains    & fiber and avoid regularly eating high fat or \"junk\" foods?  No    Taking medications regularly:  Yes    Medication side effects:  Not applicable    Ability to successfully perform activities of daily living:  No assistance needed    Home Safety:  No safety concerns identified    Hearing Impairment:  No hearing concerns    In the past 6 months, have you been bothered by leaking of urine?  No    In general, how would you rate your overall mental or emotional health?  Good      PHQ-2 Total Score: 0    Additional concerns today:  " No    Do you feel safe in your environment? Yes    Have you ever done Advance Care Planning? (For example, a Health Directive, POLST, or a discussion with a medical provider or your loved ones about your wishes): Yes, advance care planning is on file.       Fall risk  Fallen 2 or more times in the past year?: No  Any fall with injury in the past year?: No    Cognitive Screening   1) Repeat 3 items (Leader, Season, Table)    2) Clock draw: NORMAL  3) 3 item recall: Recalls 3 objects  Results: 3 items recalled: COGNITIVE IMPAIRMENT LESS LIKELY    Mini-CogTM Copyright S Mirna. Licensed by the author for use in [facility]; reprinted with permission ([email or web address]). All rights reserved.      Do you have sleep apnea, excessive snoring or daytime drowsiness?: no    Reviewed and updated as needed this visit by clinical staff   Allergies               Reviewed and updated as needed this visit by Provider                Social History     Tobacco Use     Smoking status: Never Smoker     Smokeless tobacco: Never Used   Substance Use Topics     Alcohol use: Yes     Alcohol/week: 3.0 standard drinks         Alcohol Use 10/27/2021   Prescreen: >3 drinks/day or >7 drinks/week? No               Current providers sharing in care for this patient include:   Patient Care Team:  Kristine Bridges MD as PCP - General (Family Practice)  Kristine Bridges MD as Assigned PCP    The following health maintenance items are reviewed in Epic and correct as of today:  Health Maintenance Due   Topic Date Due     ANNUAL REVIEW OF HM ORDERS  Never done     HEPATITIS C SCREENING  Never done     MEDICARE ANNUAL WELLNESS VISIT  02/04/2017     ADVANCE CARE PLANNING  02/04/2021     LIPID  02/25/2021     INFLUENZA VACCINE (1) 09/01/2021     FALL RISK ASSESSMENT  10/13/2021           Mammogram Screening - Patient over age 75, has elected to continue with screening.  Pertinent mammograms are reviewed under the imaging tab.    Review of  "Systems  Constitutional, HEENT, cardiovascular, pulmonary, GI, , musculoskeletal, neuro, skin, endocrine and psych systems are negative, except as otherwise noted.    OBJECTIVE:   /82   Pulse 72   Temp 97.7  F (36.5  C) (Oral)   Resp 16   Ht 1.619 m (5' 3.75\")   Wt 63.4 kg (139 lb 12.8 oz)   SpO2 99%   BMI 24.19 kg/m   Estimated body mass index is 24.19 kg/m  as calculated from the following:    Height as of this encounter: 1.619 m (5' 3.75\").    Weight as of this encounter: 63.4 kg (139 lb 12.8 oz).  Physical Exam  GENERAL APPEARANCE: healthy, alert and no distress  EYES: Eyes grossly normal to inspection, PERRL and conjunctivae and sclerae normal  HENT: ear canals and TM's normal, nose and mouth without ulcers or lesions, oropharynx clear and oral mucous membranes moist  NECK: no adenopathy, no asymmetry, masses, or scars and thyroid normal to palpation  RESP: lungs clear to auscultation - no rales, rhonchi or wheezes  BREAST: normal without masses, tenderness or nipple discharge and no palpable axillary masses or adenopathy  CV: regular rate and rhythm, normal S1 S2, no S3 or S4, no murmur, click or rub, no peripheral edema and peripheral pulses strong  ABDOMEN: soft, nontender, no hepatosplenomegaly, no masses and bowel sounds normal  MS: no musculoskeletal defects are noted and gait is age appropriate without ataxia  SKIN: no suspicious lesions or rashes  NEURO: Normal strength and tone, sensory exam grossly normal, mentation intact and speech normal  PSYCH: mentation appears normal and affect normal/bright      ASSESSMENT / PLAN:     Medicare annual wellness visit, subsequent  At today's visit, we discussed lifestyle interventions to promote self-management and wellness, including maintenance of a healthy weight, healthy diet, regular physical activity and exercise, and falls prevention.  She will be receiving seasonal influenza and Covid booster dose next week.  Order placed for follow-up bone " "density scan.  She has an advance healthcare directive and declines additional information.  We will screen for diabetes and dyslipidemia at lab only visit tomorrow.    Serrated polyposis syndrome  She will be due for another colonoscopy next year in follow-up of this.  No additional measures necessary.    Hypothyroidism due to Hashimoto's thyroiditis  Clinically euthyroid.  Continue levothyroxine.  Will check TSH at future lab only visit.  - TSH; Future    Impaired fasting glucose  Encouraged healthy lifestyle habits.  Will check fasting glucose and A1c at future lab only visit.  - Hemoglobin A1c; Future  - Glucose; Future    Dyslipidemia  Encouraged healthy lifestyle habits.  Will check fasting lipids at future lab only visit.  - Lipid panel reflex to direct LDL Fasting; Future    Primary insomnia  Encouraged continued efforts at good sleep hygiene.    Asymptomatic postmenopausal status  Osteopenia, unspecified location  Encourage adequate calcium and vitamin D intake, regular weightbearing exercise, measures to reduce risk of falls.  Order placed for follow-up bone density scan.  Will check vitamin D level to ensure sufficiency.  - DX Hip/Pelvis/Spine; Future  - Vitamin D Deficiency; Future    Patient has been advised of split billing requirements and indicates understanding: Yes  COUNSELING:  Reviewed preventive health counseling, as reflected in patient instructions  Special attention given to:       Regular exercise       Healthy diet/nutrition       Vision screening       Dental care       Bladder control       Fall risk prevention       Osteoporosis prevention/bone health    Estimated body mass index is 24.19 kg/m  as calculated from the following:    Height as of this encounter: 1.619 m (5' 3.75\").    Weight as of this encounter: 63.4 kg (139 lb 12.8 oz).        She reports that she has never smoked. She has never used smokeless tobacco.      Appropriate preventive services were discussed with this patient, " including applicable screening as appropriate for cardiovascular disease, diabetes, osteopenia/osteoporosis, and glaucoma.  As appropriate for age/gender, discussed screening for colorectal cancer, prostate cancer, breast cancer, and cervical cancer. Checklist reviewing preventive services available has been given to the patient.    Reviewed patients plan of care and provided an AVS. The Basic Care Plan (routine screening as documented in Health Maintenance) for Nithya meets the Care Plan requirement. This Care Plan has been established and reviewed with the Patient.    Counseling Resources:  ATP IV Guidelines  Pooled Cohorts Equation Calculator  Breast Cancer Risk Calculator  Breast Cancer: Medication to Reduce Risk  FRAX Risk Assessment  ICSI Preventive Guidelines  Dietary Guidelines for Americans, 2010  [employer]'s MyPlate  ASA Prophylaxis  Lung CA Screening    Kristine Bridges MD  [facility]    Identified Health Risks:    The patient was counseled and encouraged to consider modifying their diet and eating habits. She was provided with information on recommended healthy diet options.

## 2021-10-27 NOTE — PATIENT INSTRUCTIONS
Patient Education   Personalized Prevention Plan  You are due for the preventive services outlined below.  Your care team is available to assist you in scheduling these services.  If you have already completed any of these items, please share that information with your care team to update in your medical record.  Health Maintenance Due   Topic Date Due     ANNUAL REVIEW OF HM ORDERS  Never done     Hepatitis C Screening  Never done     Cholesterol Lab  02/25/2021     Flu Vaccine (1) 09/01/2021     FALL RISK ASSESSMENT  10/13/2021       Understanding USDA MyPlate  The USDA has guidelines to help you make healthy food choices. These are called MyPlate. MyPlate shows the food groups that make up healthy meals using the image of a place setting. Before you eat, think about the healthiest choices for what to put on your plate or in your cup or bowl. To learn more about building a healthy plate, visit www.choosemyplate.gov.    The food groups    Fruits. Any fruit or 100% fruit juice counts as part of the Fruit Group. Fruits may be fresh, canned, frozen, or dried, and may be whole, cut-up, or pureed. Make 1/2 of your plate fruits and vegetables.    Vegetables. Any vegetable or 100% vegetable juice counts as a member of the Vegetable Group. Vegetables may be fresh, frozen, canned, or dried. They can be served raw or cooked and may be whole, cut-up, or mashed. Make 1/2 of your plate fruits and vegetables.    Grains. All foods made from grains are part of the Grains Group. These include wheat, rice, oats, cornmeal, and barley. Grains are often used to make foods such as bread, pasta, oatmeal, cereal, tortillas, and grits. Grains should be no more than 1/4 of your plate. At least half of your grains should be whole grains.    Protein. This group includes meat, poultry, seafood, beans and peas, eggs, processed soy products (such as tofu), nuts (including nut butters), and seeds. Make protein choices no more than 1/4 of your  plate. Meat and poultry choices should be lean or low fat.    Dairy. The Dairy Group includes all fluid milk products and foods made from milk that contain calcium, such as yogurt and cheese. (Foods that have little calcium, such as cream, butter, and cream cheese, are not part of this group.) Most dairy choices should be low-fat or fat-free.    Oils. Oils aren't a food group, but they do contain essential nutrients. However it's important to watch your intake of oils. These are fats that are liquid at room temperature. They include canola, corn, olive, soybean, vegetable, and sunflower oil. Foods that are mainly oil include mayonnaise, certain salad dressings, and soft margarines. You likely already get your daily oil allowance from the foods you eat.  Things to limit  Eating healthy also means limiting these things in your diet:       Salt (sodium). Many processed foods have a lot of sodium. To keep sodium intake down, eat fresh vegetables, meats, poultry, and seafood when possible. Purchase low-sodium, reduced-sodium, or no-salt-added food products at the store. And don't add salt to your meals at home. Instead, season them with herbs and spices such as dill, oregano, cumin, and paprika. Or try adding flavor with lemon or lime zest and juice.    Saturated fat. Saturated fats are most often found in animal products such as beef, pork, and chicken. They are often solid at room temperature, such as butter. To reduce your saturated fat intake, choose leaner cuts of meat and poultry. And try healthier cooking methods such as grilling, broiling, roasting, or baking. For a simple lower-fat swap, use plain nonfat yogurt instead of mayonnaise when making potato salad or macaroni salad.    Added sugars. These are sugars added to foods. They are in foods such as ice cream, candy, soda, fruit drinks, sports drinks, energy drinks, cookies, pastries, jams, and syrups. Cut down on added sugars by sharing sweet treats with a  family member or friend. You can also choose fruit for dessert, and drink water or other unsweetened beverages.     OMG last reviewed this educational content on 6/1/2020 2000-2021 [employer]. All rights reserved. This information is not intended as a substitute for professional medical care. Always follow your healthcare professional's instructions.

## 2021-10-28 ENCOUNTER — LAB (OUTPATIENT)
Dept: LAB | Facility: CLINIC | Age: 76
End: 2021-10-28
Payer: COMMERCIAL

## 2021-10-28 ENCOUNTER — DOCUMENTATION ONLY (OUTPATIENT)
Dept: LAB | Facility: CLINIC | Age: 76
End: 2021-10-28

## 2021-10-28 DIAGNOSIS — R73.01 IMPAIRED FASTING GLUCOSE: ICD-10-CM

## 2021-10-28 DIAGNOSIS — E78.5 DYSLIPIDEMIA: ICD-10-CM

## 2021-10-28 DIAGNOSIS — E06.3 HYPOTHYROIDISM DUE TO HASHIMOTO'S THYROIDITIS: ICD-10-CM

## 2021-10-28 DIAGNOSIS — M85.80 OSTEOPENIA, UNSPECIFIED LOCATION: ICD-10-CM

## 2021-10-28 LAB
CHOLEST SERPL-MCNC: 192 MG/DL
FASTING STATUS PATIENT QL REPORTED: NORMAL
FASTING STATUS PATIENT QL REPORTED: NORMAL
GLUCOSE BLD-MCNC: 98 MG/DL (ref 70–125)
HBA1C MFR BLD: 5.7 % (ref 0–5.6)
HDLC SERPL-MCNC: 61 MG/DL
LDLC SERPL CALC-MCNC: 107 MG/DL
TRIGL SERPL-MCNC: 118 MG/DL
TSH SERPL DL<=0.005 MIU/L-ACNC: 2.02 UIU/ML (ref 0.3–5)

## 2021-10-28 PROCEDURE — 36415 COLL VENOUS BLD VENIPUNCTURE: CPT

## 2021-10-28 PROCEDURE — 83036 HEMOGLOBIN GLYCOSYLATED A1C: CPT

## 2021-10-28 PROCEDURE — 84443 ASSAY THYROID STIM HORMONE: CPT

## 2021-10-28 PROCEDURE — 82306 VITAMIN D 25 HYDROXY: CPT

## 2021-10-28 PROCEDURE — 82947 ASSAY GLUCOSE BLOOD QUANT: CPT

## 2021-10-28 PROCEDURE — 80061 LIPID PANEL: CPT

## 2021-10-28 NOTE — PROGRESS NOTES
Nithya Manuel has an upcoming lab appointment:    Future Appointments   Date Time Provider Department Center   10/28/2021  9:30 AM [facility]   11/3/2021  5:10 PM [facility] COVID VACCINE [facility]     Patient is scheduled for the following lab(s): Lipid, Glucose, Vitamin D, A1C, TSH    Patient has Health Maintenance labs due. Please review and place either future orders or HMPO (Review of Health Maintenance Protocol Orders), as appropriate.    Health Maintenance Due   Topic     ANNUAL REVIEW OF HM ORDERS      HEPATITIS C SCREENING          Treva Sage

## 2021-10-29 LAB — DEPRECATED CALCIDIOL+CALCIFEROL SERPL-MC: 24 UG/L (ref 30–80)

## 2021-11-03 ENCOUNTER — IMMUNIZATION (OUTPATIENT)
Dept: NURSING | Facility: CLINIC | Age: 76
End: 2021-11-03
Payer: COMMERCIAL

## 2021-11-03 PROCEDURE — 0004A PR COVID VAC PFIZER DIL RECON 30 MCG/0.3 ML IM: CPT

## 2021-11-03 PROCEDURE — 91300 PR COVID VAC PFIZER DIL RECON 30 MCG/0.3 ML IM: CPT

## 2021-11-14 DIAGNOSIS — E55.9 VITAMIN D DEFICIENCY: Primary | ICD-10-CM

## 2021-11-15 NOTE — RESULT ENCOUNTER NOTE
Your vitamin D level is low.  This can cause you to feel tired, can worsen your immune system's function, and can worsen your bone density.  Let's bring your level up quickly with high dose vitamin D 50,000 units WEEKLY for 6 weeks.  We should then recheck your level (a lab only visit).

## 2022-01-27 ENCOUNTER — ANCILLARY PROCEDURE (OUTPATIENT)
Dept: BONE DENSITY | Facility: CLINIC | Age: 77
End: 2022-01-27
Attending: FAMILY MEDICINE
Payer: COMMERCIAL

## 2022-01-27 DIAGNOSIS — Z78.0 ASYMPTOMATIC POSTMENOPAUSAL STATUS: ICD-10-CM

## 2022-01-27 PROCEDURE — 77080 DXA BONE DENSITY AXIAL: CPT | Mod: TC | Performed by: RADIOLOGY

## 2022-04-28 DIAGNOSIS — F51.01 PRIMARY INSOMNIA: ICD-10-CM

## 2022-05-01 NOTE — TELEPHONE ENCOUNTER
Routing refill request to provider for review/approval because:  Controlled substance request    Last Written Prescription Date:  10/27/21  Last Fill Quantity: 30,  # refills: 0   Last office visit provider:  10/27/21     Requested Prescriptions   Pending Prescriptions Disp Refills     diazepam (VALIUM) 2 MG tablet [Pharmacy Med Name: DIAZEPAM 2 MG TABLET] 30 tablet      Sig: TAKE 1 TABLET (2 MG) BY MOUTH NIGHTLY AS NEEDED FOR ANXIETY       There is no refill protocol information for this order          Delonte Weston RN 05/01/22 12:51 PM

## 2022-05-02 RX ORDER — DIAZEPAM 2 MG
2 TABLET ORAL
Qty: 30 TABLET | Refills: 0 | Status: SHIPPED | OUTPATIENT
Start: 2022-05-02 | End: 2022-05-17

## 2022-05-17 ENCOUNTER — MYC REFILL (OUTPATIENT)
Dept: FAMILY MEDICINE | Facility: CLINIC | Age: 77
End: 2022-05-17
Payer: COMMERCIAL

## 2022-05-17 DIAGNOSIS — F51.01 PRIMARY INSOMNIA: ICD-10-CM

## 2022-05-18 RX ORDER — DIAZEPAM 2 MG
2 TABLET ORAL
Qty: 30 TABLET | Refills: 0 | Status: SHIPPED | OUTPATIENT
Start: 2022-05-18 | End: 2022-10-27

## 2022-05-18 NOTE — TELEPHONE ENCOUNTER
Routing refill request to provider for review/approval because:  Drug not on the [facility] refill protocol     Last Written Prescription Date:  5/2/22  Last Fill Quantity: 30,  # refills: 0   Last office visit provider:  10/27/21     Requested Prescriptions   Pending Prescriptions Disp Refills     diazepam (VALIUM) 2 MG tablet 30 tablet 0     Sig: Take 1 tablet (2 mg) by mouth nightly as needed for anxiety       There is no refill protocol information for this order          Nithya Davis, RN 05/18/22 10:33 AM

## 2022-09-01 ENCOUNTER — TRANSFERRED RECORDS (OUTPATIENT)
Dept: HEALTH INFORMATION MANAGEMENT | Facility: CLINIC | Age: 77
End: 2022-09-01

## 2022-09-29 NOTE — PROGRESS NOTES
Medication Therapy Management (MTM) Encounter    ASSESSMENT:                            Hx perforated viscus: Resolved.     Hypothyroidism: Last TSH WNL. Will be rechecked at upcoming physical.     Anxiety/Insomnia: Patient appears to be using minimally and appropriately.     Polyp history: Ok to continue aspirin daily.     Osteopenia: Last DEXA showed osteopenia, will continue to monitor. Patient to ensure calcium in diet. Would recommend rechecking Vitamin D level at upcoming physical (order placed by PCP)       PLAN:                            Ok to continue current medications.     Follow-up: As needed with MTM     SUBJECTIVE/OBJECTIVE:                          Nithya Manuel is a 77 year old female called for a transitions of care visit. She was discharged from [facility] on 9/8/2022 for perforated abdominal viscus.      Reason for visit: transitions of care  Medication Adherence/Access: Patient is very familiar with her medications, no issues.     Hx perforated viscus: Patient presented to [facility] with acute onset abdominal pain. CT abdomen/pelvis with small volume pneumoperitoneum in the upper abdomen with 2 cm linear metallic density anterior to the stomach that appears to be partially coursing through the wall of the stomach mild fat stranding around the area. She was seen by surgery and underwent laparoscopic lysis of adhesions and removal of foreign body on 09/06. This was a metallic wire brush from a grill.  She had a follow-up with surgery on 9/19/2022 and was told she can resume normal activities and normal diet. Reports that her  grilled hamgburgers she must have swallowed a grill brush which was an inch long. Woke up with lots of pain. No issues since home.     Hypothyroidism: Currently taking levothyroxine 100 mcg. No current symptoms.   TSH   Date Value Ref Range Status   10/28/2021 2.02 0.30 - 5.00 uIU/mL Final       Anxiety/Insomnia: Taking diazepam 2 mg nightly as needed for  anxiety - uses occasionally. Sometimes life is not great and issues with sleep which is when she uses.   No flowsheet data found.    Polyp history: Patient is taking Aspirin 81 mg daily - no ASCVD. Reports part of her colon removed and she would like to continue.     Osteopenia: Not taking any supplements. Admits that she is not taking any supplements, eats well, no longer taking Vitamin d, was getting sun this summer.   Last DEXA on 1/27/22 showed T score -1.2  Lab Results   Component Value Date    VITDT 24 (L) 10/28/2021         Today's Vitals: There were no vitals taken for this visit.     Allergies/ADRs: Reviewed in chart  Past Medical History: Reviewed in chart  Tobacco: She reports that she has never smoked. She has never used smokeless tobacco.  Alcohol: reviewed in chart    ----------------  Post Discharge Medication Reconciliation Status: discharge medications reconciled and changed, per note/orders.    I spent 8 minutes with this patient today. All changes were made via collaborative practice agreement with Kristine Bridges MD. A copy of the visit note was provided to the patient's provider(s).    The patient was sent via Issuu a summary of these recommendations.     Millicent Hercules, Pharm.D., BCACP   Medication Therapy Management Pharmacist   [facility]     Telemedicine Visit Details  Type of service:  Telephone visit  Start Time: 10:04 AM  End Time: 10:12 AM  Originating Location (patient location): Home  Distant Location (provider location):  [facility]     Medication Therapy Recommendations  No medication therapy recommendations to display

## 2022-09-30 ENCOUNTER — VIRTUAL VISIT (OUTPATIENT)
Dept: PHARMACY | Facility: CLINIC | Age: 77
End: 2022-09-30
Payer: COMMERCIAL

## 2022-09-30 DIAGNOSIS — M85.80 OSTEOPENIA, UNSPECIFIED LOCATION: ICD-10-CM

## 2022-09-30 DIAGNOSIS — F51.01 PRIMARY INSOMNIA: ICD-10-CM

## 2022-09-30 DIAGNOSIS — D12.6 SERRATED POLYPOSIS SYNDROME: ICD-10-CM

## 2022-09-30 DIAGNOSIS — R19.8 PERFORATED VISCUS: Primary | ICD-10-CM

## 2022-09-30 DIAGNOSIS — E06.3 HYPOTHYROIDISM DUE TO HASHIMOTO'S THYROIDITIS: ICD-10-CM

## 2022-09-30 PROCEDURE — 99605 MTMS BY PHARM NP 15 MIN: CPT | Performed by: PHARMACIST

## 2022-09-30 PROCEDURE — 99607 MTMS BY PHARM ADDL 15 MIN: CPT | Performed by: PHARMACIST

## 2022-09-30 RX ORDER — ASPIRIN 81 MG/1
81 TABLET ORAL DAILY
COMMUNITY

## 2022-09-30 NOTE — LETTER
"Recommended To-Do List      Prepared on: Sep 30, 2022       You can get the best results from your medications by completing the items on this \"To-Do List.\"      Bring your To-Do List when you go to your doctor. And, share it with your family or caregivers.    My To-Do List:  What we talked about: What I should do:    What my medicines are for, how to know if my medicines are working, made sure my medicines are safe for me and reviewed how to take my medicines.      Take my medicines every day. When you see Dr. Bridges in October we will monitor your thyroid and Vitamin D levels.                   "

## 2022-09-30 NOTE — LETTER
_  Medication List        Prepared on: Sep 30, 2022     Bring your Medication List when you go to the doctor, hospital, or   emergency room. And, share it with your family or caregivers.     Note any changes to how you take your medications.  Cross out medications when you no longer use them.    Medication How I take it Why I use it Prescriber   aspirin 81 MG EC tablet Take 81 mg by mouth daily History of polyps Kristine Bridges MD   diazepam (VALIUM) 2 MG tablet Take 1 tablet (2 mg) by mouth nightly as needed for anxiety Insomnia Kristine Bridges MD   levothyroxine (SYNTHROID/LEVOTHROID) 100 MCG tablet Take 1 tablet (100 mcg) by mouth daily Hypothyroidism  Kristine Bridges MD         Add new medications, over-the-counter drugs, herbals, vitamins, or  minerals in the blank rows below.    Medication How I take it Why I use it Prescriber                          Allergies:      penicillins; sulfa (sulfonamide antibiotics) [sulfa drugs]        Side effects I have had:               Other Information:              My notes and questions:

## 2022-09-30 NOTE — LETTER
September 30, 2022  Nithya Manuel  [address] [place] [facility] [address]    Dear Ms. Manuel, [place] [facility]     Thank you for talking with me on Sep 30, 2022 about your health and medications. As a follow-up to our conversation, I have included two documents:      1. Your Recommended To-Do List has steps you should take to get the best results from your medications.  2. Your Medication List will help you keep track of your medications and how to take them.    If you want to talk about these documents, please call Millicent Hercules PharmD at phone: [phone], Monday-Friday 8-4:30pm.    I look forward to working with you and your doctors to make sure your medications work well for you.    Sincerely,  Millicent Hercules PharmD  [facility] Pharmacist, [facility]

## 2022-10-20 ENCOUNTER — HOSPITAL ENCOUNTER (OUTPATIENT)
Dept: MAMMOGRAPHY | Facility: CLINIC | Age: 77
Discharge: HOME | End: 2022-10-20
Attending: FAMILY MEDICINE | Admitting: FAMILY MEDICINE
Payer: COMMERCIAL

## 2022-10-20 DIAGNOSIS — Z12.31 VISIT FOR SCREENING MAMMOGRAM: ICD-10-CM

## 2022-10-20 PROCEDURE — 77067 SCR MAMMO BI INCL CAD: CPT

## 2022-10-25 ASSESSMENT — ENCOUNTER SYMPTOMS
WEAKNESS: 0
SHORTNESS OF BREATH: 0
DIARRHEA: 0
FREQUENCY: 0
HEADACHES: 0
HEARTBURN: 0
DIZZINESS: 0
HEMATURIA: 0
ABDOMINAL PAIN: 0
EYE PAIN: 0
DYSURIA: 0
FEVER: 0
MYALGIAS: 0
ARTHRALGIAS: 0
BREAST MASS: 0
HEMATOCHEZIA: 0
JOINT SWELLING: 0
NAUSEA: 0
PALPITATIONS: 0
CONSTIPATION: 0
COUGH: 0
NERVOUS/ANXIOUS: 0
PARESTHESIAS: 0
SORE THROAT: 0
CHILLS: 0

## 2022-10-25 ASSESSMENT — ACTIVITIES OF DAILY LIVING (ADL): CURRENT_FUNCTION: NO ASSISTANCE NEEDED

## 2022-10-27 ENCOUNTER — OFFICE VISIT (OUTPATIENT)
Dept: FAMILY MEDICINE | Facility: CLINIC | Age: 77
End: 2022-10-27
Payer: COMMERCIAL

## 2022-10-27 VITALS
WEIGHT: 133.8 LBS | TEMPERATURE: 98.3 F | HEIGHT: 64 IN | RESPIRATION RATE: 18 BRPM | HEART RATE: 72 BPM | BODY MASS INDEX: 22.84 KG/M2 | DIASTOLIC BLOOD PRESSURE: 64 MMHG | OXYGEN SATURATION: 99 % | SYSTOLIC BLOOD PRESSURE: 126 MMHG

## 2022-10-27 DIAGNOSIS — D12.6 SERRATED POLYPOSIS SYNDROME: ICD-10-CM

## 2022-10-27 DIAGNOSIS — Z00.00 MEDICARE ANNUAL WELLNESS VISIT, SUBSEQUENT: Primary | ICD-10-CM

## 2022-10-27 DIAGNOSIS — E78.5 DYSLIPIDEMIA: ICD-10-CM

## 2022-10-27 DIAGNOSIS — M85.80 OSTEOPENIA, UNSPECIFIED LOCATION: ICD-10-CM

## 2022-10-27 DIAGNOSIS — R73.01 IMPAIRED FASTING GLUCOSE: ICD-10-CM

## 2022-10-27 DIAGNOSIS — E06.3 HYPOTHYROIDISM DUE TO HASHIMOTO'S THYROIDITIS: ICD-10-CM

## 2022-10-27 DIAGNOSIS — E55.9 VITAMIN D DEFICIENCY: ICD-10-CM

## 2022-10-27 DIAGNOSIS — F51.01 PRIMARY INSOMNIA: ICD-10-CM

## 2022-10-27 DIAGNOSIS — Z11.59 NEED FOR HEPATITIS C SCREENING TEST: ICD-10-CM

## 2022-10-27 LAB
CHOLEST SERPL-MCNC: 207 MG/DL
HBA1C MFR BLD: 5.7 % (ref 0–5.6)
HDLC SERPL-MCNC: 66 MG/DL
LDLC SERPL CALC-MCNC: 108 MG/DL
NONHDLC SERPL-MCNC: 141 MG/DL
TRIGL SERPL-MCNC: 167 MG/DL
TSH SERPL DL<=0.005 MIU/L-ACNC: 1.17 UIU/ML (ref 0.3–4.2)

## 2022-10-27 PROCEDURE — G0439 PPPS, SUBSEQ VISIT: HCPCS | Performed by: FAMILY MEDICINE

## 2022-10-27 PROCEDURE — 99214 OFFICE O/P EST MOD 30 MIN: CPT | Mod: 25 | Performed by: FAMILY MEDICINE

## 2022-10-27 PROCEDURE — 83036 HEMOGLOBIN GLYCOSYLATED A1C: CPT | Performed by: FAMILY MEDICINE

## 2022-10-27 PROCEDURE — 36415 COLL VENOUS BLD VENIPUNCTURE: CPT | Performed by: FAMILY MEDICINE

## 2022-10-27 PROCEDURE — 82306 VITAMIN D 25 HYDROXY: CPT | Performed by: FAMILY MEDICINE

## 2022-10-27 PROCEDURE — 84443 ASSAY THYROID STIM HORMONE: CPT | Performed by: FAMILY MEDICINE

## 2022-10-27 PROCEDURE — 80061 LIPID PANEL: CPT | Performed by: FAMILY MEDICINE

## 2022-10-27 RX ORDER — LEVOTHYROXINE SODIUM 100 UG/1
100 TABLET ORAL DAILY
Qty: 90 TABLET | Refills: 4 | Status: SHIPPED | OUTPATIENT
Start: 2022-10-27 | End: 2024-01-18

## 2022-10-27 RX ORDER — DIAZEPAM 2 MG
2 TABLET ORAL
Qty: 30 TABLET | Refills: 0 | Status: SHIPPED | OUTPATIENT
Start: 2022-10-27 | End: 2023-06-06

## 2022-10-27 ASSESSMENT — ENCOUNTER SYMPTOMS
FEVER: 0
HEARTBURN: 0
CONSTIPATION: 0
NAUSEA: 0
COUGH: 0
EYE PAIN: 0
HEADACHES: 0
PARESTHESIAS: 0
WEAKNESS: 0
HEMATURIA: 0
SHORTNESS OF BREATH: 0
BREAST MASS: 0
HEMATOCHEZIA: 0
DIZZINESS: 0
SORE THROAT: 0
DIARRHEA: 0
ABDOMINAL PAIN: 0
NERVOUS/ANXIOUS: 0
PALPITATIONS: 0
CHILLS: 0
JOINT SWELLING: 0
MYALGIAS: 0
FREQUENCY: 0
DYSURIA: 0
ARTHRALGIAS: 0

## 2022-10-27 ASSESSMENT — ACTIVITIES OF DAILY LIVING (ADL): CURRENT_FUNCTION: NO ASSISTANCE NEEDED

## 2022-10-27 ASSESSMENT — PAIN SCALES - GENERAL: PAINLEVEL: NO PAIN (0)

## 2022-10-27 NOTE — PATIENT INSTRUCTIONS
Patient Education   Personalized Prevention Plan  You are due for the preventive services outlined below.  Your care team is available to assist you in scheduling these services.  If you have already completed any of these items, please share that information with your care team to update in your medical record.  Health Maintenance Due   Topic Date Due     Hepatitis B Vaccine (1 of 3 - 3-dose series) Never done     COVID-19 Vaccine (4 - Booster for Pfizer series) 12/29/2021     Flu Vaccine (1) 09/01/2022       Urinary Incontinence, Female (Adult)   Urinary incontinence means loss of bladder control. This problem affects many women, especially as they get older. If you have incontinence, you may be embarrassed to ask for help. But know that this problem can be treated.   Types of Incontinence  There are different types of incontinence. Two of the main types are described here. You can have more than one type.     Stress incontinence. With this type, urine leaks when pressure (stress) is put on the bladder. This may happen when you cough, sneeze, or laugh. Stress incontinence most often occurs because the pelvic floor muscles that support the bladder and urethra are weak. This can happen after pregnancy and vaginal childbirth or a hysterectomy. It can also be due to excess body weight or hormone changes.    Urge incontinence (also called overactive bladder). With this type, a sudden urge to urinate is felt often. This may happen even though there may not be much urine in the bladder. The need to urinate often during the night is common. Urge incontinence most often occurs because of bladder spasms. This may be due to bladder irritation or infection. Damage to bladder nerves or pelvic muscles, constipation, and certain medicines can also lead to urge incontinence.  Treatment depends on the cause. Further evaluation is needed to find the type you have. This will likely include an exam and certain tests. Based on the  results, you and your healthcare provider can then plan treatment. Until a diagnosis is made, the home care tips below can help ease symptoms.   Home care    Do pelvic floor muscle exercises, if they are prescribed. The pelvic floor muscles help support the bladder and urethra. Many women find that their symptoms improve when doing special exercises that strengthen these muscles. To do the exercises, contract the muscles you would use to stop your stream of urine. But do this when you re not urinating. Hold for 10 seconds, then relax. Repeat 10 to 20 times in a row, at least 3 times a day. Your healthcare provider may give you other instructions for how to do the exercises and how often.    Keep a bladder diary. This helps track how often and how much you urinate over a set period of time. Bring this diary with you to your next visit with the provider. The information can help your provider learn more about your bladder problem.    Lose weight, if advised to by your provider. Extra weight puts pressure on the bladder. Your provider can help you create a weight-loss plan that s right for you. This may include exercising more and making certain diet changes.    Don't have foods and drinks that may irritate the bladder. These can include alcohol and caffeinated drinks.    Quit smoking. Smoking and other tobacco use can lead to a long-term (chronic) cough that strains the pelvic floor muscles. Smoking may also damage the bladder and urethra. Talk with your provider about treatments or methods you can use to quit smoking.    If drinking large amounts of fluid makes you have symptoms, you may be advised to limit your fluid intake. You may also be advised to drink most of your fluids during the day and to limit fluids at night.    If you re worried about urine leakage or accidents, you may wear absorbent pads to catch urine. Change the pads often. This helps reduce discomfort. It may also reduce the risk of skin or bladder  infections.    Follow-up care  Follow up with your healthcare provider, or as directed. It may take some to find the right treatment for your problem. But healthy lifestyle changes can be made right away. These include such things as exercising on a regular basis, eating a healthy diet, losing weight (if needed), and quitting smoking. Your treatment plan may include special therapies or medicines. Certain procedures or surgery may also be options. Talk about any questions you have with your provider.   When to seek medical advice  Call the healthcare provider right away if any of these occur:    Fever of 100.4 F (38 C) or higher, or as directed by your provider    Bladder pain or fullness    Belly swelling    Nausea or vomiting    Back pain    Weakness, dizziness, or fainting  Omayra last reviewed this educational content on 1/1/2020 2000-2021 [employer]. All rights reserved. This information is not intended as a substitute for professional medical care. Always follow your healthcare professional's instructions.

## 2022-10-27 NOTE — PROGRESS NOTES
"SUBJECTIVE:   Vivian is a 77 year old who presents for Preventive Visit.        Are you in the first 12 months of your Medicare coverage?  No    Doing well and feeling well.  Thyroid in good balance on levothyroxine.  She is not fasting today but is due for follow-up of mild dyslipidemia and impaired fasting glucose.  She has a history of osteopenia and vitamin D deficiency due for follow-up.  Previous history of serrated polyposis syndrome status post partial colectomy, most recent colonoscopy was last month with follow-up recommended in 2 years.  She is uncertain if she wants to continue colonoscopies as the preps are difficult.  Remains on diazepam as needed for insomnia.    Healthy Habits:     In general, how would you rate your overall health?  Excellent    Duration of exercise:  15-30 minutes    Do you usually eat at least 4 servings of fruit and vegetables a day, include whole grains    & fiber and avoid regularly eating high fat or \"junk\" foods?  Yes    Taking medications regularly:  Yes    Medication side effects:  None    Ability to successfully perform activities of daily living:  No assistance needed    Home Safety:  No safety concerns identified    Hearing Impairment:  No hearing concerns    In the past 6 months, have you been bothered by leaking of urine? Yes    In general, how would you rate your overall mental or emotional health?  Excellent      PHQ-2 Total Score: 0    Additional concerns today:  No    Do you feel safe in your environment? Yes    Have you ever done Advance Care Planning? (For example, a Health Directive, POLST, or a discussion with a medical provider or your loved ones about your wishes): Yes, advance care planning is on file.       Fall risk  Fallen 2 or more times in the past year?: No  Any fall with injury in the past year?: No    Cognitive Screening   1) Repeat 3 items (Leader, Season, Table)    2) Clock draw: NORMAL  3) 3 item recall: Recalls 3 objects  Results: 3 items recalled: " COGNITIVE IMPAIRMENT LESS LIKELY    Mini-CogTM Copyright MARTINA Bradley. Licensed by the author for use in [facility]; reprinted with permission (cori@.[facility]). All rights reserved.        Reviewed and updated as needed this visit by clinical staff   Tobacco  Allergies  Meds              Reviewed and updated as needed this visit by Provider                 Social History     Tobacco Use     Smoking status: Never     Smokeless tobacco: Never   Substance Use Topics     Alcohol use: Yes     Alcohol/week: 3.0 standard drinks         Alcohol Use 10/25/2022   Prescreen: >3 drinks/day or >7 drinks/week? No       Current providers sharing in care for this patient include:   Patient Care Team:  Kristine Bridges MD as PCP - General (Family Practice)  Kristine Bridges MD as Assigned PCP  Millicent Hercules PharmD as MTM Pharammaurast (Pharmacist)    The following health maintenance items are reviewed in Epic and correct as of today:  Health Maintenance   Topic Date Due     ANNUAL REVIEW OF HM ORDERS  Never done     HEPATITIS B IMMUNIZATION (1 of 3 - 3-dose series) Never done     HEPATITIS C SCREENING  Never done     COVID-19 Vaccine (4 - Booster for Pfizer series) 12/29/2021     INFLUENZA VACCINE (1) 09/01/2022     MEDICARE ANNUAL WELLNESS VISIT  10/27/2022     FALL RISK ASSESSMENT  10/27/2023     COLORECTAL CANCER SCREENING  09/01/2024     DTAP/TDAP/TD IMMUNIZATION (2 - Td or Tdap) 12/05/2024     ADVANCE CARE PLANNING  10/27/2026     LIPID  10/28/2026     DEXA  01/27/2037     PHQ-2 (once per calendar year)  Completed     Pneumococcal Vaccine: 65+ Years  Completed     ZOSTER IMMUNIZATION  Completed     IPV IMMUNIZATION  Aged Out     MENINGITIS IMMUNIZATION  Aged Out     Lab work is in process  Labs reviewed in EPIC  BP Readings from Last 3 Encounters:   10/27/22 126/64   10/27/21 118/82    Wt Readings from Last 3 Encounters:   10/27/22 60.7 kg (133 lb 12.8 oz)   10/27/21 63.4 kg (139 lb 12.8 oz)   07/31/19 60.6  kg (133 lb 9.6 oz)                  Patient Active Problem List   Diagnosis     Insomnia     Impaired Fasting Glucose     Hypothyroidism     Dyslipidemia     Serrated polyposis syndrome     Osteopenia, unspecified location     Vitamin D deficiency     Past Surgical History:   Procedure Laterality Date     LAPAROTOMY EXPLORATORY      Due to infertility     OVARIAN CYST SURGERY Right 12/26/2013    [facility]     TUBAL LIGATION         Social History     Tobacco Use     Smoking status: Never     Smokeless tobacco: Never   Substance Use Topics     Alcohol use: Yes     Alcohol/week: 3.0 standard drinks     No family history on file.      Current Outpatient Medications   Medication Sig Dispense Refill     aspirin 81 MG EC tablet Take 81 mg by mouth daily       diazepam (VALIUM) 2 MG tablet Take 1 tablet (2 mg) by mouth nightly as needed for anxiety 30 tablet 0     levothyroxine (SYNTHROID/LEVOTHROID) 100 MCG tablet Take 1 tablet (100 mcg) by mouth daily 90 tablet 4     Allergies   Allergen Reactions     Penicillins Hives     Sulfa (Sulfonamide Antibiotics) [Sulfa Drugs] Other (See Comments)     Not sure if its her or her son.     Recent Labs   Lab Test 10/28/21  0927 10/19/20  1145 07/17/19  1030 07/17/19  1030 05/17/17  1351 02/25/16  0901 10/30/14  0911   A1C 5.7*  --   --  6.0 6.1* 5.9  --      --   --   --   --  119 121   HDL 61  --   --   --   --  61 59   TRIG 118  --   --   --   --  80 89   TSH 2.02 1.53   < > 1.75  --   --   --     < > = values in this interval not displayed.      Pertinent mammograms are reviewed under the imaging tab.    Review of Systems   Constitutional: Negative for chills and fever.   HENT: Negative for congestion, ear pain, hearing loss and sore throat.    Eyes: Negative for pain and visual disturbance.   Respiratory: Negative for cough and shortness of breath.    Cardiovascular: Negative for chest pain, palpitations and peripheral edema.   Gastrointestinal: Negative for abdominal  "pain, constipation, diarrhea, heartburn, hematochezia and nausea.   Breasts:  Negative for tenderness, breast mass and discharge.   Genitourinary: Negative for dysuria, frequency, genital sores, hematuria, pelvic pain, urgency, vaginal bleeding and vaginal discharge.   Musculoskeletal: Negative for arthralgias, joint swelling and myalgias.   Neurological: Negative for dizziness, weakness, headaches and paresthesias.   Psychiatric/Behavioral: Negative for mood changes. The patient is not nervous/anxious.          OBJECTIVE:   /64   Pulse 72   Temp 98.3  F (36.8  C) (Oral)   Resp 18   Ht 1.619 m (5' 3.75\")   Wt 60.7 kg (133 lb 12.8 oz)   SpO2 99%   BMI 23.15 kg/m   Estimated body mass index is 23.15 kg/m  as calculated from the following:    Height as of this encounter: 1.619 m (5' 3.75\").    Weight as of this encounter: 60.7 kg (133 lb 12.8 oz).     Physical Exam  GENERAL APPEARANCE: healthy, alert and no distress  EYES: Eyes grossly normal to inspection, PERRL and conjunctivae and sclerae normal  HENT: ear canals and TM's normal, nose and mouth without ulcers or lesions, oropharynx clear and oral mucous membranes moist  NECK: no adenopathy, no asymmetry, masses, or scars and thyroid normal to palpation  RESP: lungs clear to auscultation - no rales, rhonchi or wheezes  BREAST: normal without masses, tenderness or nipple discharge and no palpable axillary masses or adenopathy  CV: regular rate and rhythm, normal S1 S2, no S3 or S4, no murmur, click or rub, no peripheral edema and peripheral pulses strong  ABDOMEN: soft, nontender, no hepatosplenomegaly, no masses and bowel sounds normal  MS: no musculoskeletal defects are noted and gait is age appropriate without ataxia  SKIN: no suspicious lesions or rashes  NEURO: Normal strength and tone, sensory exam grossly normal, mentation intact and speech normal  PSYCH: mentation appears normal and affect normal/bright    Diagnostic Test Results:  Labs reviewed " in Epic    ASSESSMENT / PLAN:     Medicare annual wellness visit, subsequent  At today's visit, we discussed lifestyle interventions to promote self-management and wellness, including maintenance of a healthy weight, healthy diet, regular physical activity and exercise, and falls prevention.  She will be receiving flu vaccine at a future date.  She is uncertain about COVID-vaccine and would like to decline today.  Up-to-date with bone density screening.  She has an advance healthcare directive in place.    Impaired fasting glucose  She is not fasting today, will check A1c  - Hemoglobin A1c; Future    Dyslipidemia  Very mild, has been doing well with healthy lifestyle habits.  We will check nonfasting lipids today.  - Lipid panel reflex to direct LDL Non-fasting; Future    Hypothyroidism due to Hashimoto's thyroiditis  Clinically euthyroid.  Continue levothyroxine.  Will check TSH today.  - levothyroxine (SYNTHROID/LEVOTHROID) 100 MCG tablet; Take 1 tablet (100 mcg) by mouth daily  - TSH with free T4 reflex; Future    Vitamin D deficiency  Will check vitamin D level today.  - Vitamin D Deficiency; Future    Serrated polyposis syndrome  Up-to-date with colon cancer screening.  Followed by gastroenterology.    Osteopenia, unspecified location  Will check vitamin D level today.  - Vitamin D Deficiency; Future    Primary insomnia  May use diazepam sparingly as needed.  - diazepam (VALIUM) 2 MG tablet; Take 1 tablet (2 mg) by mouth nightly as needed for anxiety    Need for hepatitis C screening test  We discussed risk versus benefits of hepatitis C screening and unidentified infection.  She is at low risk and declines hepatitis C screening    Patient has been advised of split billing requirements and indicates understanding: Yes      COUNSELING:  Reviewed preventive health counseling, as reflected in patient instructions    Estimated body mass index is 23.15 kg/m  as calculated from the following:    Height as of this  "encounter: 1.619 m (5' 3.75\").    Weight as of this encounter: 60.7 kg (133 lb 12.8 oz).        She reports that she has never smoked. She has never used smokeless tobacco.      Appropriate preventive services were discussed with this patient, including applicable screening as appropriate for cardiovascular disease, diabetes, osteopenia/osteoporosis, and glaucoma.  As appropriate for age/gender, discussed screening for colorectal cancer, prostate cancer, breast cancer, and cervical cancer. Checklist reviewing preventive services available has been given to the patient.    Reviewed patients plan of care and provided an AVS. The Basic Care Plan (routine screening as documented in Health Maintenance) for Nithya meets the Care Plan requirement. This Care Plan has been established and reviewed with the Patient.    Counseling Resources:  ATP IV Guidelines  Pooled Cohorts Equation Calculator  Breast Cancer Risk Calculator  Breast Cancer: Medication to Reduce Risk  FRAX Risk Assessment  ICSI Preventive Guidelines  Dietary Guidelines for Americans, 2010  [employer] MyPlate  ASA Prophylaxis  Lung CA Screening    Kristine rBidges MD  [facility]    Identified Health Risks:    Information on urinary incontinence and treatment options given to patient.  "

## 2022-10-28 LAB — DEPRECATED CALCIDIOL+CALCIFEROL SERPL-MC: 35 UG/L (ref 20–75)

## 2022-11-02 ENCOUNTER — IMMUNIZATION (OUTPATIENT)
Dept: FAMILY MEDICINE | Facility: CLINIC | Age: 77
End: 2022-11-02
Payer: COMMERCIAL

## 2022-11-02 PROCEDURE — 90662 IIV NO PRSV INCREASED AG IM: CPT

## 2022-11-02 PROCEDURE — G0008 ADMIN INFLUENZA VIRUS VAC: HCPCS

## 2023-01-26 PROBLEM — Z86.0100 HISTORY OF COLONIC POLYPS: Status: ACTIVE | Noted: 2020-08-24

## 2023-01-26 PROBLEM — K63.5 POLYP OF COLON: Status: ACTIVE | Noted: 2018-10-18

## 2023-01-26 PROBLEM — D72.829 LEUKOCYTOSIS: Status: ACTIVE | Noted: 2023-01-26

## 2023-01-26 PROBLEM — D12.2 BENIGN NEOPLASM OF ASCENDING COLON: Status: ACTIVE | Noted: 2018-10-22

## 2023-01-26 PROBLEM — R19.8 PERFORATED ABDOMINAL VISCUS: Status: ACTIVE | Noted: 2023-01-26

## 2023-01-26 PROBLEM — K57.30 DIVERTICULAR DISEASE OF LARGE INTESTINE: Status: ACTIVE | Noted: 2018-10-18

## 2023-01-27 ENCOUNTER — OFFICE VISIT (OUTPATIENT)
Dept: FAMILY MEDICINE | Facility: CLINIC | Age: 78
End: 2023-01-27
Payer: COMMERCIAL

## 2023-01-27 VITALS
OXYGEN SATURATION: 95 % | DIASTOLIC BLOOD PRESSURE: 60 MMHG | HEART RATE: 77 BPM | TEMPERATURE: 98.8 F | SYSTOLIC BLOOD PRESSURE: 128 MMHG | RESPIRATION RATE: 20 BRPM

## 2023-01-27 DIAGNOSIS — R22.1 NECK MASS: Primary | ICD-10-CM

## 2023-01-27 PROCEDURE — 99213 OFFICE O/P EST LOW 20 MIN: CPT | Performed by: FAMILY MEDICINE

## 2023-01-27 ASSESSMENT — PAIN SCALES - GENERAL: PAINLEVEL: NO PAIN (0)

## 2023-01-27 NOTE — PROGRESS NOTES
Assessment & Plan     Neck mass  Discussed differential diagnosis with patient.  Differential diagnosis would include lipoma, cyst, enlarged salivary grand.  Does not feel like a lymph node.  Recommend further evaluation with CT scan of neck.  Further recommendations will be made once those results are available.  - CT Soft Tissue Neck w/o & w Contrast                   No follow-ups on file.    Rosa Maria Cunningham MD  [facility] R[place]LL Park is a 77 year old, presenting for the following health issues:  Mass      HPI   She comes in today for evaluation of a lump near her the posterior aspect of her jaw on the left side.  She first noticed this around Thanksgiving.  It has not changed in size since then.  It feels soft and is mobile.  She does notice that if she turns her head in a certain direction.  She feels like it pushes into her throat and causes her throat to be irritated.  She does not have any other symptoms.  She does not have any trouble swallowing or difficulty breathing.  She is otherwise feeling well.  She has not had fatigue.  No weight loss.  No fevers or chills.  No other symptoms of concern.  Review of systems is otherwise negative.  No other questions today        Review of Systems         Objective    /60 (BP Location: Right arm, Patient Position: Sitting, Cuff Size: Adult Regular)   Pulse 77   Temp 98.8  F (37.1  C) (Temporal)   Resp 20   SpO2 95%   There is no height or weight on file to calculate BMI.  Physical Exam   GENERAL: healthy, alert and no distress  PSYCH: mentation appears normal, affect normal/bright  Skin: Near the posterior angle of her left mandible is a soft tissue mass.  This is not tender.  No associated erythema, fluctuance or induration.  It is mobile.

## 2023-01-27 NOTE — PROGRESS NOTES
Answers for HPI/ROS submitted by the patient on 1/20/2023  How many servings of fruits and vegetables do you eat daily?: 4 or more  On average, how many sweetened beverages do you drink each day (Examples: soda, juice, sweet tea, etc.  Do NOT count diet or artificially sweetened beverages)?: 1  How many minutes a day do you exercise enough to make your heart beat faster?: 30 to 60  How many days a week do you exercise enough to make your heart beat faster?: 7  How many days per week do you miss taking your medication?: 0  What is the reason for your visit today?: Swollen gland on neck+  When did your symptoms begin?: More than a month

## 2023-02-04 ENCOUNTER — TELEPHONE (OUTPATIENT)
Dept: FAMILY MEDICINE | Facility: CLINIC | Age: 78
End: 2023-02-04
Payer: COMMERCIAL

## 2023-02-04 DIAGNOSIS — R22.1 NECK MASS: Primary | ICD-10-CM

## 2023-02-04 NOTE — TELEPHONE ENCOUNTER
Please let pt know that her insurance will not cover a CT of the neck but it will cover an ultrasound. I have placed an order for an ultrasound. She should cancel the neck CT and have the ultrasound instead

## 2023-02-04 NOTE — TELEPHONE ENCOUNTER
----- Message from Karla Bay sent at 2/3/2023  9:06 AM CST -----  Regarding: Insurance Denial Received  Insurance Denial Notification    Patient Name:  Nithya Manuel  :    1945  MRN:    [id number]    Dr. Reynoso,    The authorization for procedure CT soft tissue neck w/wo contrast on date of service 23 has been denied by the patient's insurance for the following reason:    Denial Reason:   The request cannot be approved because:  It must be needed for one of the following types of neck mass (growth).  -At least 1.5 centimeters in size.  -Firm or fixed in place.  -Suspected abscess (a painful collection of pus).  -Other neck mass listed in this guideline.    A limited or follow up picture study using sound waves (ultrasound or US) is more likely  to show your doctor all of the details they need to see in order to treat you. This study  will be approved if requested.    A [id number] review can be done by calling the insurance/third party authorization vendor with the following information:    Insurance: BCBS Medicare Advantage  Auth Vendor:  [employer]  Phone #: [phone]  Due Date:     Patient ID: [id number]  Case/Ref #: [id number]    Please let us know how you wish to proceed as soon as possible. Also, please contact your patient to notify her of this denial & inform her of her options for imaging.    If you complete the peer to peer call with [employer] & receive an approval, please provide me with the authorization number and dates it is valid for & I will update the referral for the patient.    Thank you,    [facility] [[email or web address]] [facility] [place]

## 2023-02-06 ENCOUNTER — HOSPITAL ENCOUNTER (OUTPATIENT)
Dept: ULTRASOUND IMAGING | Facility: CLINIC | Age: 78
Discharge: HOME | End: 2023-02-06
Attending: FAMILY MEDICINE | Admitting: FAMILY MEDICINE
Payer: COMMERCIAL

## 2023-02-06 ENCOUNTER — TELEPHONE (OUTPATIENT)
Dept: FAMILY MEDICINE | Facility: CLINIC | Age: 78
End: 2023-02-06
Payer: COMMERCIAL

## 2023-02-06 DIAGNOSIS — R22.1 NECK MASS: Primary | ICD-10-CM

## 2023-02-06 DIAGNOSIS — R22.1 NECK MASS: ICD-10-CM

## 2023-02-06 LAB — RADIOLOGIST FLAGS: ABNORMAL

## 2023-02-06 PROCEDURE — 76536 US EXAM OF HEAD AND NECK: CPT

## 2023-02-06 NOTE — TELEPHONE ENCOUNTER
I spoke to the patient. Cancelled CT scan due to insurance denial. Transferred patient to imaging scheduling to schedule an US instead.

## 2023-02-06 NOTE — TELEPHONE ENCOUNTER
Reason for Call:  Stacie from imaging calling regarding imaging results     Detailed comments: US head/neck/soft tissue. Left submandibular gland 1.1 cm indeterminate nodule corresponds to the palpable lump. Neoplasm is possible. fna recommended. Additional 3-5mm left submandibular gland hypoechoic nodules.     They would like patient to reached regarding a follow up.    Forwarded to dr daniel and covering provider    Phone Number Patient can be reached at: Other phone number:  [phone] imagining    Best Time: anytime    Can we leave a detailed message on this number? Not Applicable    Call taken on 2/6/2023 at 3:34 PM by Jazmín Atkins

## 2023-02-10 ENCOUNTER — PATIENT OUTREACH (OUTPATIENT)
Dept: GASTROENTEROLOGY | Facility: CLINIC | Age: 78
End: 2023-02-10
Payer: COMMERCIAL

## 2023-02-15 ENCOUNTER — HOSPITAL ENCOUNTER (OUTPATIENT)
Dept: ULTRASOUND IMAGING | Facility: CLINIC | Age: 78
Discharge: HOME | End: 2023-02-15
Attending: FAMILY MEDICINE | Admitting: FAMILY MEDICINE
Payer: COMMERCIAL

## 2023-02-15 DIAGNOSIS — R22.1 NECK MASS: ICD-10-CM

## 2023-02-15 PROCEDURE — 88305 TISSUE EXAM BY PATHOLOGIST: CPT | Mod: TC | Performed by: FAMILY MEDICINE

## 2023-02-15 PROCEDURE — 272N000710 US BIOPSY HEAD NECK THORAX SOFT TISSUE

## 2023-02-15 PROCEDURE — 88173 CYTOPATH EVAL FNA REPORT: CPT | Mod: TC | Performed by: FAMILY MEDICINE

## 2023-02-15 PROCEDURE — 272N000710 US BIOPSY PAROTID FINE NEEDLE ASPIRATION

## 2023-02-28 PROCEDURE — 88305 TISSUE EXAM BY PATHOLOGIST: CPT | Mod: 26 | Performed by: PATHOLOGY

## 2023-02-28 PROCEDURE — 88173 CYTOPATH EVAL FNA REPORT: CPT | Mod: 26 | Performed by: PATHOLOGY

## 2023-02-28 PROCEDURE — 88172 CYTP DX EVAL FNA 1ST EA SITE: CPT | Mod: 26 | Performed by: PATHOLOGY

## 2023-03-02 ENCOUNTER — OFFICE VISIT (OUTPATIENT)
Dept: FAMILY MEDICINE | Facility: CLINIC | Age: 78
End: 2023-03-02
Payer: COMMERCIAL

## 2023-03-02 VITALS
OXYGEN SATURATION: 97 % | SYSTOLIC BLOOD PRESSURE: 124 MMHG | HEIGHT: 64 IN | WEIGHT: 133 LBS | RESPIRATION RATE: 18 BRPM | TEMPERATURE: 98 F | HEART RATE: 74 BPM | DIASTOLIC BLOOD PRESSURE: 60 MMHG | BODY MASS INDEX: 22.71 KG/M2

## 2023-03-02 DIAGNOSIS — D11.0 BENIGN TUMOR OF PAROTID GLAND: Primary | ICD-10-CM

## 2023-03-02 PROCEDURE — 99213 OFFICE O/P EST LOW 20 MIN: CPT | Performed by: FAMILY MEDICINE

## 2023-03-02 ASSESSMENT — PAIN SCALES - GENERAL: PAINLEVEL: NO PAIN (0)

## 2023-03-02 NOTE — PROGRESS NOTES
"  Assessment & Plan     Benign tumor of parotid gland  We will refer to ENT for further guidance in regards to ongoing monitoring versus intervention.  She will notify us should she notice an increase in size, pain, or other function.  - Adult ENT  Referral; Future           MED REC REQUIRED  Post Medication Reconciliation Status: discharge medications reconciled, continue medications without change    No follow-ups on file.    Kristine Bridges MD  [facility] R[place] Park is a 77 year old, presenting for the following health issues:  Review biopsy results      Seen initially due to mass identified in right neck and jaw area.  Ultrasound suggestive of submandibular gland mass, biopsy performed of this mass which was actually thought to be in the parotid gland, this identified oncocytic changes suggestive of benign neoplasm such as Warthin's tumor or oncocytic tumor.  She had initially some increased pain and swelling in the area, that is settled down.  No ongoing discomfort.  Perhaps a little more swollen than prior to the biopsy.  No difficulties with saliva production or swallowing.  She wonders whether NG tube could have triggered this.             Review of Systems         Objective    /60   Pulse 74   Temp 98  F (36.7  C) (Oral)   Resp 18   Ht 1.619 m (5' 3.75\")   Wt 60.3 kg (133 lb)   SpO2 97%   BMI 23.01 kg/m    Body mass index is 23.01 kg/m .  Physical Exam   Alert and pleasant.  Palpable firm mass just underneath the angle of her jaw on the left-hand side.  No surrounding lymphadenopathy.  No tenderness, redness, or edema.                    "

## 2023-03-06 NOTE — TELEPHONE ENCOUNTER
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 [place] [address]  Phone [phone]  Fax [phone] 2/15/23 Case: [id number] Parotid Gland, Left      [employer]                               2023 at 7:42 AM - Send req for path -Henny  3/21/23 1:18pm - received pacs and sent it to lab- Sarah

## 2023-03-21 NOTE — TELEPHONE ENCOUNTER
Action March 21, 2023 1:15 PM Sarah Jennings Taken Slides from [place] received and taken to [address] [place] [address] path lab for review.

## 2023-03-22 LAB
PATH REPORT.ADDENDUM SPEC: ABNORMAL
PATH REPORT.COMMENTS IMP SPEC: ABNORMAL
PATH REPORT.COMMENTS IMP SPEC: YES
PATH REPORT.FINAL DX SPEC: ABNORMAL
PATH REPORT.GROSS SPEC: ABNORMAL
PATH REPORT.MICROSCOPIC SPEC OTHER STN: ABNORMAL
PATH REPORT.RELEVANT HX SPEC: ABNORMAL

## 2023-03-27 ENCOUNTER — OFFICE VISIT (OUTPATIENT)
Dept: OTOLARYNGOLOGY | Facility: CLINIC | Age: 78
End: 2023-03-27
Payer: COMMERCIAL

## 2023-03-27 ENCOUNTER — PRE VISIT (OUTPATIENT)
Dept: OTOLARYNGOLOGY | Facility: CLINIC | Age: 78
End: 2023-03-27

## 2023-03-27 VITALS
DIASTOLIC BLOOD PRESSURE: 78 MMHG | WEIGHT: 139 LBS | HEIGHT: 64 IN | SYSTOLIC BLOOD PRESSURE: 167 MMHG | OXYGEN SATURATION: 99 % | BODY MASS INDEX: 23.73 KG/M2 | HEART RATE: 85 BPM

## 2023-03-27 DIAGNOSIS — D11.0 BENIGN TUMOR OF PAROTID GLAND: ICD-10-CM

## 2023-03-27 PROCEDURE — 99203 OFFICE O/P NEW LOW 30 MIN: CPT | Performed by: STUDENT IN AN ORGANIZED HEALTH CARE EDUCATION/TRAINING PROGRAM

## 2023-03-27 ASSESSMENT — PAIN SCALES - GENERAL: PAINLEVEL: NO PAIN (0)

## 2023-03-27 NOTE — LETTER
3/27/2023       RE: Nithya Manuel  [address] [facility] [address]     Dear Colleague,    Thank you for referring your patient, Nithya Manuel, to the [facility] [place] [facility]. Please see a copy of my visit note below.    March 27, 2023      Kristine Bridges M.D.  [place] [id number]      Dear Dr. Bridges,     I had the pleasure of meeting Ms. Manuel today.    HISTORY OF PRESENT ILLNESS:  She is a pleasant 77-year-old woman who was referred for evaluation of a left parotid mass.  This was self-identified.  She underwent initial ultrasound of this on 02/06/2023 that reported a 1.1 cm mass in the superficial left parotid gland.  A biopsy was performed on 02/15/2023 that was reported as an oncocytic neoplasm.  This has been reviewed here at the [facility] as well.  Aside from feeling the mass, she has no symptoms related to it.    PAST MEDICAL HISTORY:  Hypothyroidism.    PAST SURGICAL HISTORY:    1.  Ovary surgery.    2.  Tubal ligation.    MEDICATIONS:    1.  Synthroid.  2.  Aspirin.    ALLERGIES:    1.  PENICILLINS.  2.  SULFA.    SOCIAL HISTORY:  She is a never smoker.  She drinks alcohol occasionally.  No drug use.    FAMILY HISTORY:  None.    REVIEW OF SYSTEMS:  A 10-point review of systems was performed and negative except as noted in the HPI.    PHYSICAL EXAMINATION:  On examination, she is alert, in no acute distress.  She has a palpable mass in the tail of the parotid gland.  Her facial nerve function is normal.  No lesions are seen in the oral cavity or oropharynx.    ASSESSMENT AND PLAN:  A 77-year-old woman with a small parotid mass on the left side.  Fine needle aspirate is suggestive of an oncocytic lesion, likely benign in nature.  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  I reviewed the options of  continued surveillance versus surgical resection.  This would require a parotidectomy.  We reviewed the risks of the operation including but not limited to infection, bleeding, return to the operating room, facial nerve injury, Lawson's syndrome, and first bite syndrome.  At this point, she seems inclined to observe this, which I support.  She will let me know if I can help her in the future.    Thank you for allowing me to participate in the care of this patient. If you have any further questions, please do not hesitate to contact me.      Sincerely,      Erasmo Howard M.D.      Head and Neck Surgical Oncology and Microvascular Reconstruction  Department of Otolaryngology - Head and Neck Surgery  [facility]        30 minutes spent on the date of the encounter in chart review, patient visit, review of tests, documentation and/or discussion with other providers about the issues documented above.

## 2023-03-27 NOTE — PATIENT INSTRUCTIONS
1. Please follow-up in clinic as needed.   2. Please call the ENT clinic with any questions,concerns, new or worsening symptoms.    -Clinic number is [phone]   - Cecilia's direct line (Dr. Howard's nurse) [phone]

## 2023-03-28 NOTE — PROGRESS NOTES
March 27, 2023      Kristine Bridges M.D.  [place] [address]      Dear Dr. Bridges,     I had the pleasure of meeting Ms. Manuel today.    HISTORY OF PRESENT ILLNESS:  She is a pleasant 77-year-old woman who was referred for evaluation of a left parotid mass.  This was self-identified.  She underwent initial ultrasound of this on 02/06/2023 that reported a 1.1 cm mass in the superficial left parotid gland.  A biopsy was performed on 02/15/2023 that was reported as an oncocytic neoplasm.  This has been reviewed here at the [facility] as well.  Aside from feeling the mass, she has no symptoms related to it.    PAST MEDICAL HISTORY:  Hypothyroidism.    PAST SURGICAL HISTORY:    1.  Ovary surgery.    2.  Tubal ligation.    MEDICATIONS:    1.  Synthroid.  2.  Aspirin.    ALLERGIES:    1.  PENICILLINS.  2.  SULFA.    SOCIAL HISTORY:  She is a never smoker.  She drinks alcohol occasionally.  No drug use.    FAMILY HISTORY:  None.    REVIEW OF SYSTEMS:  A 10-point review of systems was performed and negative except as noted in the HPI.    PHYSICAL EXAMINATION:  On examination, she is alert, in no acute distress.  She has a palpable mass in the tail of the parotid gland.  Her facial nerve function is normal.  No lesions are seen in the oral cavity or oropharynx.    ASSESSMENT AND PLAN:  A 77-year-old woman with a small parotid mass on the left side.  Fine needle aspirate is suggestive of an oncocytic lesion, likely benign in nature.  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  I reviewed the options of continued surveillance versus surgical resection.  This would require a parotidectomy.  We reviewed the risks of the operation including but not limited to infection, bleeding, return to the operating room, facial nerve injury, Lawson's syndrome, and first bite syndrome.  At this point, she seems inclined to observe this,  which I support.  She will let me know if I can help her in the future.    Thank you for allowing me to participate in the care of this patient. If you have any further questions, please do not hesitate to contact me.      Sincerely,      Erasmo Howard M.D.      Head and Neck Surgical Oncology and Microvascular Reconstruction  Department of Otolaryngology - Head and Neck Surgery  [facility]        30 minutes spent on the date of the encounter in chart review, patient visit, review of tests, documentation and/or discussion with other providers about the issues documented above.

## 2023-04-13 ENCOUNTER — TRANSFERRED RECORDS (OUTPATIENT)
Dept: HEALTH INFORMATION MANAGEMENT | Facility: CLINIC | Age: 78
End: 2023-04-13
Payer: COMMERCIAL

## 2023-06-06 ENCOUNTER — MYC REFILL (OUTPATIENT)
Dept: FAMILY MEDICINE | Facility: CLINIC | Age: 78
End: 2023-06-06
Payer: COMMERCIAL

## 2023-06-06 DIAGNOSIS — F51.01 PRIMARY INSOMNIA: ICD-10-CM

## 2023-06-07 RX ORDER — DIAZEPAM 2 MG
2 TABLET ORAL
Qty: 30 TABLET | Refills: 0 | Status: SHIPPED | OUTPATIENT
Start: 2023-06-07 | End: 2023-10-30

## 2023-07-25 ENCOUNTER — TRANSFERRED RECORDS (OUTPATIENT)
Dept: HEALTH INFORMATION MANAGEMENT | Facility: CLINIC | Age: 78
End: 2023-07-25
Payer: COMMERCIAL

## 2023-09-26 ENCOUNTER — TRANSFERRED RECORDS (OUTPATIENT)
Dept: HEALTH INFORMATION MANAGEMENT | Facility: CLINIC | Age: 78
End: 2023-09-26
Payer: COMMERCIAL

## 2023-10-27 ASSESSMENT — ENCOUNTER SYMPTOMS
ABDOMINAL PAIN: 0
PARESTHESIAS: 0
HEARTBURN: 0
PALPITATIONS: 0
SHORTNESS OF BREATH: 0
SORE THROAT: 0
NERVOUS/ANXIOUS: 0
JOINT SWELLING: 0
MYALGIAS: 0
DIZZINESS: 0
HEMATURIA: 0
EYE PAIN: 0
BREAST MASS: 0
COUGH: 0
HEMATOCHEZIA: 0
DIARRHEA: 0
WEAKNESS: 0
HEADACHES: 0
FREQUENCY: 0
NAUSEA: 0
DYSURIA: 0
FEVER: 0
CONSTIPATION: 0
ARTHRALGIAS: 0
CHILLS: 0

## 2023-10-27 ASSESSMENT — ACTIVITIES OF DAILY LIVING (ADL): CURRENT_FUNCTION: NO ASSISTANCE NEEDED

## 2023-10-29 PROBLEM — D72.829 LEUKOCYTOSIS: Status: RESOLVED | Noted: 2023-01-26 | Resolved: 2023-10-29

## 2023-10-29 PROBLEM — D12.2 BENIGN NEOPLASM OF ASCENDING COLON: Status: RESOLVED | Noted: 2018-10-22 | Resolved: 2023-10-29

## 2023-10-29 PROBLEM — K63.5 POLYP OF COLON: Status: RESOLVED | Noted: 2018-10-18 | Resolved: 2023-10-29

## 2023-10-29 PROBLEM — Z86.0100 HISTORY OF COLONIC POLYPS: Status: RESOLVED | Noted: 2020-08-24 | Resolved: 2023-10-29

## 2023-10-29 PROBLEM — R19.8 PERFORATED ABDOMINAL VISCUS: Status: RESOLVED | Noted: 2023-01-26 | Resolved: 2023-10-29

## 2023-10-30 ENCOUNTER — OFFICE VISIT (OUTPATIENT)
Dept: FAMILY MEDICINE | Facility: CLINIC | Age: 78
End: 2023-10-30
Attending: FAMILY MEDICINE
Payer: COMMERCIAL

## 2023-10-30 VITALS
RESPIRATION RATE: 16 BRPM | SYSTOLIC BLOOD PRESSURE: 136 MMHG | OXYGEN SATURATION: 99 % | TEMPERATURE: 98.1 F | WEIGHT: 131.1 LBS | HEART RATE: 70 BPM | HEIGHT: 64 IN | DIASTOLIC BLOOD PRESSURE: 88 MMHG | BODY MASS INDEX: 22.38 KG/M2

## 2023-10-30 DIAGNOSIS — R73.01 IMPAIRED FASTING GLUCOSE: ICD-10-CM

## 2023-10-30 DIAGNOSIS — D11.0 BENIGN TUMOR OF PAROTID GLAND: ICD-10-CM

## 2023-10-30 DIAGNOSIS — F51.01 PRIMARY INSOMNIA: ICD-10-CM

## 2023-10-30 DIAGNOSIS — Z78.0 ASYMPTOMATIC POSTMENOPAUSAL STATUS: ICD-10-CM

## 2023-10-30 DIAGNOSIS — E78.5 DYSLIPIDEMIA: ICD-10-CM

## 2023-10-30 DIAGNOSIS — E06.3 HYPOTHYROIDISM DUE TO HASHIMOTO'S THYROIDITIS: ICD-10-CM

## 2023-10-30 DIAGNOSIS — M85.80 OSTEOPENIA, UNSPECIFIED LOCATION: ICD-10-CM

## 2023-10-30 DIAGNOSIS — Z00.00 MEDICARE ANNUAL WELLNESS VISIT, SUBSEQUENT: Primary | ICD-10-CM

## 2023-10-30 DIAGNOSIS — E55.9 VITAMIN D DEFICIENCY: ICD-10-CM

## 2023-10-30 DIAGNOSIS — D12.6 SERRATED POLYPOSIS SYNDROME: ICD-10-CM

## 2023-10-30 LAB — HBA1C MFR BLD: 5.8 % (ref 0–5.6)

## 2023-10-30 PROCEDURE — 84443 ASSAY THYROID STIM HORMONE: CPT | Performed by: FAMILY MEDICINE

## 2023-10-30 PROCEDURE — 90662 IIV NO PRSV INCREASED AG IM: CPT | Performed by: FAMILY MEDICINE

## 2023-10-30 PROCEDURE — 82306 VITAMIN D 25 HYDROXY: CPT | Performed by: FAMILY MEDICINE

## 2023-10-30 PROCEDURE — 83036 HEMOGLOBIN GLYCOSYLATED A1C: CPT | Performed by: FAMILY MEDICINE

## 2023-10-30 PROCEDURE — 36415 COLL VENOUS BLD VENIPUNCTURE: CPT | Performed by: FAMILY MEDICINE

## 2023-10-30 PROCEDURE — 99213 OFFICE O/P EST LOW 20 MIN: CPT | Mod: 25 | Performed by: FAMILY MEDICINE

## 2023-10-30 PROCEDURE — G0008 ADMIN INFLUENZA VIRUS VAC: HCPCS | Performed by: FAMILY MEDICINE

## 2023-10-30 PROCEDURE — G0439 PPPS, SUBSEQ VISIT: HCPCS | Performed by: FAMILY MEDICINE

## 2023-10-30 PROCEDURE — 80061 LIPID PANEL: CPT | Performed by: FAMILY MEDICINE

## 2023-10-30 RX ORDER — DIAZEPAM 2 MG
2 TABLET ORAL
Qty: 30 TABLET | Refills: 0 | Status: SHIPPED | OUTPATIENT
Start: 2023-10-30 | End: 2024-05-16

## 2023-10-30 RX ORDER — RESPIRATORY SYNCYTIAL VIRUS VACCINE 120MCG/0.5
0.5 KIT INTRAMUSCULAR ONCE
Qty: 1 EACH | Refills: 0 | Status: CANCELLED | OUTPATIENT
Start: 2023-10-30 | End: 2023-10-30

## 2023-10-30 ASSESSMENT — ENCOUNTER SYMPTOMS
DIARRHEA: 0
DYSURIA: 0
DIZZINESS: 0
HEMATURIA: 0
ARTHRALGIAS: 0
HEMATOCHEZIA: 0
CHILLS: 0
NAUSEA: 0
JOINT SWELLING: 0
EYE PAIN: 0
HEADACHES: 0
SHORTNESS OF BREATH: 0
MYALGIAS: 0
PARESTHESIAS: 0
CONSTIPATION: 0
COUGH: 0
ABDOMINAL PAIN: 0
FREQUENCY: 0
FEVER: 0
SORE THROAT: 0
BREAST MASS: 0
PALPITATIONS: 0
WEAKNESS: 0
HEARTBURN: 0
NERVOUS/ANXIOUS: 0

## 2023-10-30 ASSESSMENT — ACTIVITIES OF DAILY LIVING (ADL): CURRENT_FUNCTION: NO ASSISTANCE NEEDED

## 2023-10-30 ASSESSMENT — PAIN SCALES - GENERAL: PAINLEVEL: NO PAIN (0)

## 2023-10-30 NOTE — PROGRESS NOTES
Prior to immunization administration, verified patients identity using patient s name and date of birth. Please see Immunization Activity for additional information.     Screening Questionnaire for Adult Immunization    Are you sick today?   No   Do you have allergies to medications, food, a vaccine component or latex?   No   Have you ever had a serious reaction after receiving a vaccination?   No   Do you have a long-term health problem with heart, lung, kidney, or metabolic disease (e.g., diabetes), asthma, a blood disorder, no spleen, complement component deficiency, a cochlear implant, or a spinal fluid leak?  Are you on long-term aspirin therapy?   No   Do you have cancer, leukemia, HIV/AIDS, or any other immune system problem?   No   Do you have a parent, brother, or sister with an immune system problem?   No   In the past 3 months, have you taken medications that affect  your immune system, such as prednisone, other steroids, or anticancer drugs; drugs for the treatment of rheumatoid arthritis, Crohn s disease, or psoriasis; or have you had radiation treatments?   No   Have you had a seizure, or a brain or other nervous system problem?   No   During the past year, have you received a transfusion of blood or blood    products, or been given immune (gamma) globulin or antiviral drug?   No   For women: Are you pregnant or is there a chance you could become       pregnant during the next month?   No   Have you received any vaccinations in the past 4 weeks?   No     Immunization questionnaire answers were all negative.      Patient instructed to remain in clinic for 15 minutes afterwards, and to report any adverse reactions.     Screening performed by Brenda Cantu CMA on 10/30/2023 at 3:53 PM.

## 2023-10-30 NOTE — PATIENT INSTRUCTIONS
Patient Education   Personalized Prevention Plan  You are due for the preventive services outlined below.  Your care team is available to assist you in scheduling these services.  If you have already completed any of these items, please share that information with your care team to update in your medical record.  Health Maintenance Due   Topic Date Due     RSV VACCINE 60+ (1 - 1-dose 60+ series) Never done     Flu Vaccine (1) 09/01/2023     COVID-19 Vaccine (4 - 2023-24 season) 09/01/2023     ANNUAL REVIEW OF HM ORDERS  10/27/2023     Thyroid Function Lab  10/27/2023

## 2023-10-31 ENCOUNTER — TELEPHONE (OUTPATIENT)
Dept: FAMILY MEDICINE | Facility: CLINIC | Age: 78
End: 2023-10-31
Payer: COMMERCIAL

## 2023-10-31 DIAGNOSIS — D11.0 BENIGN TUMOR OF PAROTID GLAND: ICD-10-CM

## 2023-10-31 LAB
CHOLEST SERPL-MCNC: 194 MG/DL
HDLC SERPL-MCNC: 66 MG/DL
LDLC SERPL CALC-MCNC: 95 MG/DL
NONHDLC SERPL-MCNC: 128 MG/DL
TRIGL SERPL-MCNC: 167 MG/DL
TSH SERPL DL<=0.005 MIU/L-ACNC: 0.62 UIU/ML (ref 0.3–4.2)
VIT D+METAB SERPL-MCNC: 29 NG/ML (ref 20–50)

## 2023-10-31 NOTE — CONSULTS
IR and Neuroradiology consult  10/31/23  1:48 PM      Inbasket message to Kristine Granados MD    Requested for left parotid ablation reviewed with IR attending Dr. Oliver GARDUNO and Neuroradiology attendings Dr. Banks, and the services do not perform parotid ablations.      Birgit MOODY

## 2023-10-31 NOTE — TELEPHONE ENCOUNTER
----- Message from Birgit Puga PA-C sent at 10/31/2023  1:43 PM CDT -----  Regarding: parotid ablation request  Hi Dr. Bridges,     I have reviewed this with IR attending Dr. Oliver GARDUNO and Neuroradiology attendings Dr. Banks, and the services do not perform parotid ablations.      Birgit MOODY

## 2023-10-31 NOTE — TELEPHONE ENCOUNTER
Please call patient--I received a message from the IR team that they do not perform parotid gland ablation, unfortunately.  VJ

## 2023-11-01 ENCOUNTER — HOSPITAL ENCOUNTER (OUTPATIENT)
Dept: MAMMOGRAPHY | Facility: CLINIC | Age: 78
Discharge: HOME | End: 2023-11-01
Attending: FAMILY MEDICINE | Admitting: FAMILY MEDICINE
Payer: COMMERCIAL

## 2023-11-01 DIAGNOSIS — Z12.31 SCREENING MAMMOGRAM, ENCOUNTER FOR: ICD-10-CM

## 2023-11-01 PROCEDURE — 77067 SCR MAMMO BI INCL CAD: CPT

## 2024-01-08 ENCOUNTER — OFFICE VISIT (OUTPATIENT)
Dept: FAMILY MEDICINE | Facility: CLINIC | Age: 79
End: 2024-01-08
Payer: COMMERCIAL

## 2024-01-08 VITALS
HEART RATE: 73 BPM | SYSTOLIC BLOOD PRESSURE: 170 MMHG | DIASTOLIC BLOOD PRESSURE: 80 MMHG | BODY MASS INDEX: 22.16 KG/M2 | HEIGHT: 65 IN | OXYGEN SATURATION: 100 % | TEMPERATURE: 97 F

## 2024-01-08 DIAGNOSIS — E06.3 HYPOTHYROIDISM DUE TO HASHIMOTO'S THYROIDITIS: Primary | ICD-10-CM

## 2024-01-08 PROCEDURE — 99214 OFFICE O/P EST MOD 30 MIN: CPT | Performed by: STUDENT IN AN ORGANIZED HEALTH CARE EDUCATION/TRAINING PROGRAM

## 2024-01-08 ASSESSMENT — PAIN SCALES - GENERAL: PAINLEVEL: NO PAIN (0)

## 2024-01-08 NOTE — PROGRESS NOTES
[facility]  [address]  [facility] [address]  Phone: [phone]  Fax: [phone]  Primary Provider: Kristine Bridges  Pre-op Performing Provider: MINDY REVELES    PREOPERATIVE EVALUATION:  Today's date: 1/8/2024    Vivian is a 78 year old, presenting for the following:    Surgical Information:  Surgery/Procedure: Cataract Surgery   Surgery Location: [facility] [address] [place] [address]   Surgeon: Dr Lashae Dave  Surgery Date: 01/22/2024  Time of Surgery: none  Where patient plans to recover: At home with family  Fax number for surgical facility: [phone]    Assessment & Plan     78-year-old female with past with history of hypothyroidism who presents for preop evaluation for cataract surgery.  Patient is overall low risk and this is a very low risk procedure.  Her hypothyroidism is currently controlled.  Her blood pressure was elevated today at 170/80 but per patient report has been normal at home and has had many normal values over the last year.  She will start checking this at home again.  I do not recommend any lab testing before this.  Patient is approved to proceed with surgery.    The proposed surgical procedure is considered LOW risk.     - No identified additional risk factors other than previously addressed    Antiplatelet or Anticoagulation Medication Instructions:   - aspirin: Discontinue aspirin 7-10 days prior to procedure to reduce bleeding risk. It should be resumed postoperatively.     Additional Medication Instructions:  Patient is to take all scheduled medications on the day of surgery    RECOMMENDATION:  APPROVAL GIVEN to proceed with proposed procedure, without further diagnostic evaluation.      Subjective     HPI related to upcoming procedure: bilateral cataract    BP Readings from Last 6 Encounters:   01/08/24 (!) 170/80   10/30/23 136/88   03/27/23 (!) 167/78   03/02/23 124/60   01/27/23 128/60   10/27/22 126/64         1/1/2024      7:56 PM   Preop Questions   1. Have you ever had a heart attack or stroke? No   2. Have you ever had surgery on your heart or blood vessels, such as a stent placement, a coronary artery bypass, or surgery on an artery in your head, neck, heart, or legs? No   3. Do you have chest pain with activity? No   4. Do you have a history of  heart failure? No   5. Do you currently have a cold, bronchitis or symptoms of other infection? No   6. Do you have a cough, shortness of breath, or wheezing? No   7. Do you or anyone in your family have previous history of blood clots? No   8. Do you or does anyone in your family have a serious bleeding problem such as prolonged bleeding following surgeries or cuts? No   9. Have you ever had problems with anemia or been told to take iron pills? No   10. Have you had any abnormal blood loss such as black, tarry or bloody stools, or abnormal vaginal bleeding? No   11. Have you ever had a blood transfusion? No   12. Are you willing to have a blood transfusion if it is medically needed before, during, or after your surgery? Yes   13. Have you or any of your relatives ever had problems with anesthesia? No   14. Do you have sleep apnea, excessive snoring or daytime drowsiness? No   15. Do you have any artifical heart valves or other implanted medical devices like a pacemaker, defibrillator, or continuous glucose monitor? No   16. Do you have artificial joints? No   17. Are you allergic to latex? No       Preoperative Review of :   reviewed - no record of controlled substances prescribed.    Review of Systems  Complete ROS is negative except as noted in HPI    Patient Active Problem List    Diagnosis Date Noted    Benign tumor of parotid gland 03/02/2023     Priority: Medium    Vitamin D deficiency 11/14/2021     Priority: Medium    Osteopenia, unspecified location 10/27/2021     Priority: Medium    Serrated polyposis syndrome 07/17/2019     Priority: Medium     Diagnosed 5/2019 due  "to mulitple serrated polyps on colonoscopy; children   should be screened starting at age 40.  Partial colectomy July 2019        Diverticular disease of large intestine 10/18/2018     Priority: Medium    Hypothyroidism      Priority: Medium     Created by Conversion  Replacement Utility updated for latest IMO load        Insomnia      Priority: Medium     Created by Conversion        Impaired Fasting Glucose      Priority: Medium     Created by Conversion        Dyslipidemia      Priority: Medium     Created by Conversion          No past medical history on file.  Past Surgical History:   Procedure Laterality Date    LAPAROTOMY EXPLORATORY      Due to infertility    OVARIAN CYST SURGERY Right 12/26/2013    [facility]    TUBAL LIGATION       Current Outpatient Medications   Medication Sig Dispense Refill    aspirin 81 MG EC tablet Take 81 mg by mouth daily      diazepam (VALIUM) 2 MG tablet Take 1 tablet (2 mg) by mouth nightly as needed for anxiety 30 tablet 0    levothyroxine (SYNTHROID/LEVOTHROID) 100 MCG tablet Take 1 tablet (100 mcg) by mouth daily 90 tablet 4       Allergies   Allergen Reactions    Penicillins Hives    Sulfa (Sulfonamide Antibiotics) [Sulfa Antibiotics] Other (See Comments)     Not sure if its her or her son.        Social History     Tobacco Use    Smoking status: Never    Smokeless tobacco: Never   Substance Use Topics    Alcohol use: Yes     Alcohol/week: 3.0 standard drinks of alcohol       History   Drug Use No         Objective     BP (!) 170/80   Pulse 73   Temp 97  F (36.1  C)   Ht 1.638 m (5' 4.5\")   SpO2 100%   BMI 22.16 kg/m      Physical Exam    General appearance: Alert, cooperative, no distress, appears stated age  Head: Normocephalic, atraumatic, without obvious abnormality  Eyes: Pupils equal round, reactive.  Conjunctiva clear.  Nose: Nares normal, no drainage.  Throat: Lips, mucosa, tongue normal mucosa pink and moist  Neck: Supple, symmetric, trachea midline, no " adenopathy.  No thyroid enlargement, tenderness or nodules.    Lungs: Clear to auscultation bilaterally, no wheezing or crackles present.  Respirations unlabored  Heart: Regular rate and rhythm, normal S1 and S2, no murmur, rub or gallop.  Extremities: Extremities normal, atraumatic.  No cyanosis or edema.    Recent Labs   Lab Test 10/30/23  1555 10/27/22  1426   A1C 5.8* 5.7*        Diagnostics:  No labs were ordered during this visit.   No EKG required for low risk surgery (cataract, skin procedure, breast biopsy, etc).    Revised Cardiac Risk Index (RCRI):  The patient has the following serious cardiovascular risks for perioperative complications:   - No serious cardiac risks = 0 points     RCRI Interpretation: 0 points: Class I (very low risk - 0.4% complication rate)         Signed Electronically by: Chaitanya Najera MD  Copy of this evaluation report is provided to requesting physician.

## 2024-01-18 ENCOUNTER — MYC REFILL (OUTPATIENT)
Dept: FAMILY MEDICINE | Facility: CLINIC | Age: 79
End: 2024-01-18
Payer: COMMERCIAL

## 2024-01-18 DIAGNOSIS — E06.3 HYPOTHYROIDISM DUE TO HASHIMOTO'S THYROIDITIS: ICD-10-CM

## 2024-01-19 RX ORDER — LEVOTHYROXINE SODIUM 100 UG/1
100 TABLET ORAL DAILY
Qty: 90 TABLET | Refills: 1 | Status: SHIPPED | OUTPATIENT
Start: 2024-01-19 | End: 2024-07-18

## 2024-01-19 RX ORDER — LEVOTHYROXINE SODIUM 100 UG/1
100 TABLET ORAL DAILY
Qty: 90 TABLET | Refills: 4 | OUTPATIENT
Start: 2024-01-19

## 2024-02-12 ENCOUNTER — HOSPITAL ENCOUNTER (OUTPATIENT)
Dept: ULTRASOUND IMAGING | Facility: CLINIC | Age: 79
Discharge: HOME | End: 2024-02-12
Attending: FAMILY MEDICINE | Admitting: FAMILY MEDICINE
Payer: COMMERCIAL

## 2024-02-12 DIAGNOSIS — D11.0 BENIGN TUMOR OF PAROTID GLAND: ICD-10-CM

## 2024-02-12 PROCEDURE — 76536 US EXAM OF HEAD AND NECK: CPT

## 2024-05-16 ENCOUNTER — MYC REFILL (OUTPATIENT)
Dept: FAMILY MEDICINE | Facility: CLINIC | Age: 79
End: 2024-05-16
Payer: COMMERCIAL

## 2024-05-16 DIAGNOSIS — F51.01 PRIMARY INSOMNIA: ICD-10-CM

## 2024-05-17 RX ORDER — DIAZEPAM 2 MG
2 TABLET ORAL
Qty: 30 TABLET | Refills: 0 | Status: SHIPPED | OUTPATIENT
Start: 2024-05-17

## 2024-07-16 ENCOUNTER — ANCILLARY PROCEDURE (OUTPATIENT)
Dept: BONE DENSITY | Facility: CLINIC | Age: 79
End: 2024-07-16
Attending: FAMILY MEDICINE
Payer: COMMERCIAL

## 2024-07-16 DIAGNOSIS — M85.80 OSTEOPENIA, UNSPECIFIED LOCATION: ICD-10-CM

## 2024-07-16 DIAGNOSIS — Z78.0 ASYMPTOMATIC POSTMENOPAUSAL STATUS: ICD-10-CM

## 2024-07-16 PROCEDURE — 77080 DXA BONE DENSITY AXIAL: CPT | Mod: TC | Performed by: PHYSICIAN ASSISTANT

## 2024-07-16 PROCEDURE — 77081 DXA BONE DENSITY APPENDICULR: CPT | Mod: TC | Performed by: PHYSICIAN ASSISTANT

## 2024-07-18 DIAGNOSIS — E06.3 HYPOTHYROIDISM DUE TO HASHIMOTO'S THYROIDITIS: ICD-10-CM

## 2024-07-18 RX ORDER — LEVOTHYROXINE SODIUM 100 UG/1
100 TABLET ORAL DAILY
Qty: 90 TABLET | Refills: 0 | Status: SHIPPED | OUTPATIENT
Start: 2024-07-18

## 2024-09-05 ENCOUNTER — TRANSFERRED RECORDS (OUTPATIENT)
Dept: HEALTH INFORMATION MANAGEMENT | Facility: CLINIC | Age: 79
End: 2024-09-05
Payer: COMMERCIAL

## 2024-10-17 DIAGNOSIS — E06.3 HYPOTHYROIDISM DUE TO HASHIMOTO'S THYROIDITIS: ICD-10-CM

## 2024-10-17 RX ORDER — LEVOTHYROXINE SODIUM 100 UG/1
100 TABLET ORAL DAILY
Qty: 90 TABLET | Refills: 0 | Status: SHIPPED | OUTPATIENT
Start: 2024-10-17 | End: 2024-10-22

## 2024-10-21 SDOH — HEALTH STABILITY: PHYSICAL HEALTH: ON AVERAGE, HOW MANY MINUTES DO YOU ENGAGE IN EXERCISE AT THIS LEVEL?: 40 MIN

## 2024-10-21 SDOH — HEALTH STABILITY: PHYSICAL HEALTH: ON AVERAGE, HOW MANY DAYS PER WEEK DO YOU ENGAGE IN MODERATE TO STRENUOUS EXERCISE (LIKE A BRISK WALK)?: 5 DAYS

## 2024-10-21 ASSESSMENT — SOCIAL DETERMINANTS OF HEALTH (SDOH): HOW OFTEN DO YOU GET TOGETHER WITH FRIENDS OR RELATIVES?: PATIENT DECLINED

## 2024-10-22 ENCOUNTER — OFFICE VISIT (OUTPATIENT)
Dept: FAMILY MEDICINE | Facility: CLINIC | Age: 79
End: 2024-10-22
Payer: COMMERCIAL

## 2024-10-22 VITALS
WEIGHT: 134 LBS | RESPIRATION RATE: 18 BRPM | BODY MASS INDEX: 23.74 KG/M2 | TEMPERATURE: 98 F | HEART RATE: 70 BPM | SYSTOLIC BLOOD PRESSURE: 140 MMHG | OXYGEN SATURATION: 98 % | DIASTOLIC BLOOD PRESSURE: 78 MMHG | HEIGHT: 63 IN

## 2024-10-22 DIAGNOSIS — E55.9 VITAMIN D DEFICIENCY: ICD-10-CM

## 2024-10-22 DIAGNOSIS — M54.16 RIGHT LUMBAR RADICULOPATHY: ICD-10-CM

## 2024-10-22 DIAGNOSIS — E78.5 DYSLIPIDEMIA: ICD-10-CM

## 2024-10-22 DIAGNOSIS — F51.01 PRIMARY INSOMNIA: ICD-10-CM

## 2024-10-22 DIAGNOSIS — R73.01 IMPAIRED FASTING GLUCOSE: ICD-10-CM

## 2024-10-22 DIAGNOSIS — D11.0 BENIGN TUMOR OF PAROTID GLAND: ICD-10-CM

## 2024-10-22 DIAGNOSIS — E06.3 HYPOTHYROIDISM DUE TO HASHIMOTO'S THYROIDITIS: ICD-10-CM

## 2024-10-22 DIAGNOSIS — Z00.00 MEDICARE ANNUAL WELLNESS VISIT, SUBSEQUENT: Primary | ICD-10-CM

## 2024-10-22 DIAGNOSIS — M85.80 OSTEOPENIA, UNSPECIFIED LOCATION: ICD-10-CM

## 2024-10-22 PROBLEM — D12.6 SERRATED POLYPOSIS SYNDROME: Status: RESOLVED | Noted: 2019-07-17 | Resolved: 2024-10-22

## 2024-10-22 LAB
EST. AVERAGE GLUCOSE BLD GHB EST-MCNC: 126 MG/DL
HBA1C MFR BLD: 6 % (ref 0–5.6)

## 2024-10-22 PROCEDURE — 84443 ASSAY THYROID STIM HORMONE: CPT | Performed by: FAMILY MEDICINE

## 2024-10-22 PROCEDURE — 99214 OFFICE O/P EST MOD 30 MIN: CPT | Mod: 25 | Performed by: FAMILY MEDICINE

## 2024-10-22 PROCEDURE — 36415 COLL VENOUS BLD VENIPUNCTURE: CPT | Performed by: FAMILY MEDICINE

## 2024-10-22 PROCEDURE — 80061 LIPID PANEL: CPT | Performed by: FAMILY MEDICINE

## 2024-10-22 PROCEDURE — G0439 PPPS, SUBSEQ VISIT: HCPCS | Performed by: FAMILY MEDICINE

## 2024-10-22 PROCEDURE — 83036 HEMOGLOBIN GLYCOSYLATED A1C: CPT | Performed by: FAMILY MEDICINE

## 2024-10-22 PROCEDURE — 82306 VITAMIN D 25 HYDROXY: CPT | Performed by: FAMILY MEDICINE

## 2024-10-22 RX ORDER — PREDNISONE 20 MG/1
TABLET ORAL
Qty: 12 TABLET | Refills: 0 | Status: SHIPPED | OUTPATIENT
Start: 2024-10-22

## 2024-10-22 RX ORDER — DIAZEPAM 2 MG/1
2 TABLET ORAL
Qty: 30 TABLET | Refills: 1 | Status: SHIPPED | OUTPATIENT
Start: 2024-10-22

## 2024-10-22 RX ORDER — LEVOTHYROXINE SODIUM 100 UG/1
100 TABLET ORAL DAILY
Qty: 90 TABLET | Refills: 4 | Status: SHIPPED | OUTPATIENT
Start: 2024-10-22

## 2024-10-22 ASSESSMENT — PAIN SCALES - GENERAL: PAINLEVEL: SEVERE PAIN (6)

## 2024-10-22 NOTE — PROGRESS NOTES
Preventive Care Visit  [facility]  Kristine Bridges MD, Family Medicine  Oct 22, 2024      Assessment & Plan     Medicare annual wellness visit, subsequent  At today's visit, we discussed lifestyle interventions to promote self-management and wellness, including maintenance of a healthy weight, healthy diet, regular physical activity and exercise, and falls prevention.  Up-to-date with influenza vaccine and other vaccines, encourage consideration of COVID-vaccine and RSV vaccine, she will consider but declines today.  Will obtain nonfasting lipids, A1c to screen for diabetes and dyslipidemia.  Up-to-date with mammogram screening, bone density screening, and no longer requiring colon cancer screening.    Impaired Fasting Glucose  Encouraged efforts at healthy lifestyle habits.  She is not fasting, therefore we will check A1c today.  - Hemoglobin A1c; Future  - Hemoglobin A1c    Dyslipidemia  Encouraged healthy lifestyle habits, will check nonfasting lipids today.  She is not currently treated for this.  - Lipid panel reflex to direct LDL Non-fasting; Future  - Lipid panel reflex to direct LDL Non-fasting    Osteopenia, unspecified location  Encouraged weightbearing activities, measures to reduce risk of falls, adequate calcium and vitamin D intake.  Will check vitamin D level today.  - Vitamin D Deficiency; Future  - Vitamin D Deficiency    Vitamin D deficiency  We will check vitamin D level today.  - Vitamin D Deficiency; Future  - Vitamin D Deficiency    Primary insomnia  Intermittent need for diazepam, 1 to 2 x 1 week.  She continues nonmedication measures to improve sleep.  She is aware of increased risk of sedation, falling, dementia with regular intake of benzodiazepines.  - diazepam (VALIUM) 2 MG tablet; Take 1 tablet (2 mg) by mouth nightly as needed for anxiety.    Benign tumor of parotid gland  Asymptomatic, clinically no changes.  Will repeat ultrasound yearly.  - US Parotid;  Future    Hypothyroidism due to Hashimoto's thyroiditis  Clinically euthyroid, continue levothyroxine.  Will check TSH cascade today.  - TSH WITH FREE T4 REFLEX; Future  - levothyroxine (SYNTHROID/LEVOTHROID) 100 MCG tablet; Take 1 tablet (100 mcg) by mouth daily.  - TSH WITH FREE T4 REFLEX    Right lumbar radiculopathy  Seen previously by orthopedics, symptoms and their evaluation consistent with lumbar radiculopathy.  She has side effects from NSAIDs, therefore will treat with prednisone burst which was helpful for her in the past.  If inadequate effect, advised follow-up with [facility] for consideration of injection.  - predniSONE (DELTASONE) 20 MG tablet; Take 3 tabs by mouth daily for 2 days, then 2 tabs by mouth daily for 2 days, then 1 tab by mouth daily for 2 days.            Counseling  Appropriate preventive services were addressed with this patient via screening, questionnaire, or discussion as appropriate for fall prevention, nutrition, physical activity, Tobacco-use cessation, social engagement, weight loss and cognition.  Checklist reviewing preventive services available has been given to the patient.  Reviewed patient's diet, addressing concerns and/or questions.           Katey Park is a 79 year old, presenting for the following:  Wellness Visit (Not fasting) and Right hip pain (Not a new problem)          Health Care Directive  Patient does not have a Health Care Directive or Living Will: Discussed advance care planning with patient; information given to patient to review.    HPI  Seen today for routine preventive care visit.  She is been struggling with some pain in her right buttock and outer hip region off and on, seen last year by [facility] where prednisone burst was helpful, she had been offered an injection but then seemed to be doing well.  Pain seems to come and go without any identifiable triggering or palliative factors.  Does not tolerate ibuprofen as it causes her  hands and arms to tingle.  History of benign parotid tumor, due for yearly ultrasound in February, we have been monitoring clinically.  Hypothyroidism stable on levothyroxine, she has not had any recent missed doses.  Due for follow-up of dyslipidemia and impaired fasting glucose.  History of osteopenia with low risk of fracture, last bone density scan July 2024, due for follow-up vitamin D level.  She uses diazepam sparingly, perhaps 1-2 times per week, for insomnia that awakens her in the middle the night, this seems to be working well for her.  She is exercising regularly.  Overall healthy diet.            10/21/2024   General Health   How would you rate your overall physical health? Excellent   Feel stress (tense, anxious, or unable to sleep) Patient declined            10/21/2024   Nutrition   Diet: Regular (no restrictions)            10/21/2024   Exercise   Days per week of moderate/strenous exercise 5 days   Average minutes spent exercising at this level 40 min            10/21/2024   Social Factors   Frequency of gathering with friends or relatives Patient declined   Worry food won't last until get money to buy more No   Food not last or not have enough money for food? No   Do you have housing? (Housing is defined as stable permanent housing and does not include staying ouside in a car, in a tent, in an abandoned building, in an overnight shelter, or couch-surfing.) Yes   Are you worried about losing your housing? No   Lack of transportation? No   Unable to get utilities (heat,electricity)? No            10/21/2024   Fall Risk   Fallen 2 or more times in the past year? No   Trouble with walking or balance? No             10/21/2024   Activities of Daily Living- Home Safety   Needs help with the following daily activites None of the above   Safety concerns in the home None of the above            10/21/2024   Dental   Dentist two times every year? Yes            10/21/2024   Hearing Screening   Hearing  concerns? None of the above            10/21/2024   Driving Risk Screening   Patient/family members have concerns about driving No            10/21/2024   General Alertness/Fatigue Screening   Have you been more tired than usual lately? No            10/21/2024   Urinary Incontinence Screening   Bothered by leaking urine in past 6 months No            10/21/2024   TB Screening   Were you born outside of the [place]? No              Today's PHQ-2 Score:       1/8/2024    11:43 AM   PHQ-2 ( 1999 [employer])   Q1: Little interest or pleasure in doing things 0   Q2: Feeling down, depressed or hopeless 0   PHQ-2 Score 0         10/21/2024   Substance Use   Alcohol more than 3/day or more than 7/wk No   Do you have a current opioid prescription? No   How severe/bad is pain from 1 to 10? 0/10 (No Pain)   Do you use any other substances recreationally? No        Social History     Tobacco Use    Smoking status: Never    Smokeless tobacco: Never   Vaping Use    Vaping status: Never Used   Substance Use Topics    Alcohol use: Yes     Alcohol/week: 3.0 standard drinks of alcohol    Drug use: No           11/1/2023   LAST FHS-7 RESULTS   1st degree relative breast or ovarian cancer No   Any relative bilateral breast cancer No   Any male have breast cancer No   Any ONE woman have BOTH breast AND ovarian cancer No   Any woman with breast cancer before 50yrs No   2 or more relatives with breast AND/OR ovarian cancer No   2 or more relatives with breast AND/OR bowel cancer No           Mammogram Screening - After age 74- determine frequency with patient based on health status, life expectancy and patient goals              Reviewed and updated as needed this visit by Provider                    No past medical history on file.  Past Surgical History:   Procedure Laterality Date    LAPAROTOMY EXPLORATORY      Due to infertility    OVARIAN CYST SURGERY Right 12/26/2013    [facility]    TUBAL LIGATION       OB History   No obstetric history  on file.     Lab work is in process  Labs reviewed in EPIC  BP Readings from Last 3 Encounters:   10/22/24 (!) 140/78   01/08/24 (!) 170/80   10/30/23 136/88    Wt Readings from Last 3 Encounters:   10/22/24 60.8 kg (134 lb)   10/30/23 59.5 kg (131 lb 1.6 oz)   03/27/23 63 kg (139 lb)                  Patient Active Problem List   Diagnosis    Insomnia    Impaired Fasting Glucose    Hypothyroidism    Dyslipidemia    Osteopenia, unspecified location    Vitamin D deficiency    Diverticular disease of large intestine    Benign tumor of parotid gland    Right lumbar radiculopathy    Hypothyroidism due to Hashimoto's thyroiditis     Past Surgical History:   Procedure Laterality Date    LAPAROTOMY EXPLORATORY      Due to infertility    OVARIAN CYST SURGERY Right 12/26/2013    [facility]    TUBAL LIGATION         Social History     Tobacco Use    Smoking status: Never    Smokeless tobacco: Never   Substance Use Topics    Alcohol use: Yes     Alcohol/week: 3.0 standard drinks of alcohol     No family history on file.      Current Outpatient Medications   Medication Sig Dispense Refill    aspirin 81 MG EC tablet Take 81 mg by mouth daily      diazepam (VALIUM) 2 MG tablet Take 1 tablet (2 mg) by mouth nightly as needed for anxiety. 30 tablet 1    levothyroxine (SYNTHROID/LEVOTHROID) 100 MCG tablet Take 1 tablet (100 mcg) by mouth daily. 90 tablet 4    predniSONE (DELTASONE) 20 MG tablet Take 3 tabs by mouth daily for 2 days, then 2 tabs by mouth daily for 2 days, then 1 tab by mouth daily for 2 days. 12 tablet 0     Allergies   Allergen Reactions    Penicillins Hives    Sulfa (Sulfonamide Antibiotics) [Sulfa Antibiotics] Other (See Comments)     Not sure if its her or her son.     Recent Labs   Lab Test 10/22/24  1218 10/30/23  1555 10/27/22  1426 10/28/21  0927   A1C 6.0* 5.8* 5.7* 5.7*   LDL  --  95 108* 107   HDL  --  66 66 61   TRIG  --  167* 167* 118   TSH  --  0.62 1.17 2.02      Current providers sharing in care for  "this patient include:  Patient Care Team:  Kristine Bridges MD as PCP - General (Family Practice)  Kristine Bridges MD as Assigned PCP  Erasmo Howard MD as MD (Otolaryngology)  Erasmo Howard MD as Assigned Surgical Provider  Arabella Fontana RPH as Pharmacist (Pharmacist)    The following health maintenance items are reviewed in Epic and correct as of today:  Health Maintenance   Topic Date Due    RSV VACCINE (1 - 1-dose 75+ series) Never done    COVID-19 Vaccine (4 - 2024-25 season) 09/01/2024    GLUCOSE  10/28/2024    MEDICARE ANNUAL WELLNESS VISIT  10/30/2024    LIPID  10/30/2024    TSH W/FREE T4 REFLEX  10/30/2024    ANNUAL REVIEW OF HM ORDERS  10/30/2024    DTAP/TDAP/TD IMMUNIZATION (2 - Td or Tdap) 12/05/2024    FALL RISK ASSESSMENT  10/22/2025    DEXA  07/16/2026    ADVANCE CARE PLANNING  10/31/2028    PHQ-2 (once per calendar year)  Completed    INFLUENZA VACCINE  Completed    Pneumococcal Vaccine: 65+ Years  Completed    ZOSTER IMMUNIZATION  Completed    HPV IMMUNIZATION  Aged Out    MENINGITIS IMMUNIZATION  Aged Out    RSV MONOCLONAL ANTIBODY  Aged Out    COLORECTAL CANCER SCREENING  Discontinued         Review of Systems  Constitutional, neuro, ENT, endocrine, pulmonary, cardiac, gastrointestinal, genitourinary, musculoskeletal, integument and psychiatric systems are negative, except as otherwise noted.     Objective    Exam  BP (!) 154/80   Pulse 70   Temp 98  F (36.7  C) (Temporal)   Resp 18   Ht 1.607 m (5' 3.25\")   Wt 60.8 kg (134 lb)   SpO2 98%   BMI 23.55 kg/m     Estimated body mass index is 23.55 kg/m  as calculated from the following:    Height as of this encounter: 1.607 m (5' 3.25\").    Weight as of this encounter: 60.8 kg (134 lb).    Physical Exam  GENERAL: alert and no distress  EYES: Eyes grossly normal to inspection, PERRL and conjunctivae and sclerae normal  HENT: ear canals and TM's normal, nose and mouth without ulcers or lesions  NECK: no adenopathy, no " asymmetry, masses, or scars  RESP: lungs clear to auscultation - no rales, rhonchi or wheezes  CV: regular rate and rhythm, normal S1 S2, no S3 or S4, no murmur, click or rub, no peripheral edema  ABDOMEN: soft, nontender, no hepatosplenomegaly, no masses and bowel sounds normal  MS: no gross musculoskeletal defects noted, no edema  SKIN: no suspicious lesions or rashes  NEURO: Normal strength and tone, mentation intact and speech normal  PSYCH: mentation appears normal, affect normal/bright        10/22/2024   Mini Cog   Clock Draw Score 2 Normal   3 Item Recall 3 objects recalled   Mini Cog Total Score 5                 Signed Electronically by: Kristine Bridges MD

## 2024-10-22 NOTE — PATIENT INSTRUCTIONS
Patient Education   Preventive Care Advice   This is general advice given by our system to help you stay healthy. However, your care team may have specific advice just for you. Please talk to your care team about your preventive care needs.  Nutrition  Eat 5 or more servings of fruits and vegetables each day.  Try wheat bread, brown rice and whole grain pasta (instead of white bread, rice, and pasta).  Get enough calcium and vitamin D. Check the label on foods and aim for 100% of the RDA (recommended daily allowance).  Lifestyle  Exercise at least 150 minutes each week  (30 minutes a day, 5 days a week).  Do muscle strengthening activities 2 days a week. These help control your weight and prevent disease.  No smoking.  Wear sunscreen to prevent skin cancer.  Have a dental exam and cleaning every 6 months.  Yearly exams  See your health care team every year to talk about:  Any changes in your health.  Any medicines your care team has prescribed.  Preventive care, family planning, and ways to prevent chronic diseases.  Shots (vaccines)   HPV shots (up to age 26), if you've never had them before.  Hepatitis B shots (up to age 59), if you've never had them before.  COVID-19 shot: Get this shot when it's due.  Flu shot: Get a flu shot every year.  Tetanus shot: Get a tetanus shot every 10 years.  Pneumococcal, hepatitis A, and RSV shots: Ask your care team if you need these based on your risk.  Shingles shot (for age 50 and up)  General health tests  Diabetes screening:  Starting at age 35, Get screened for diabetes at least every 3 years.  If you are younger than age 35, ask your care team if you should be screened for diabetes.  Cholesterol test: At age 39, start having a cholesterol test every 5 years, or more often if advised.  Bone density scan (DEXA): At age 50, ask your care team if you should have this scan for osteoporosis (brittle bones).  Hepatitis C: Get tested at least once in your life.  STIs (sexually  transmitted infections)  Before age 24: Ask your care team if you should be screened for STIs.  After age 24: Get screened for STIs if you're at risk. You are at risk for STIs (including HIV) if:  You are sexually active with more than one person.  You don't use condoms every time.  You or a partner was diagnosed with a sexually transmitted infection.  If you are at risk for HIV, ask about PrEP medicine to prevent HIV.  Get tested for HIV at least once in your life, whether you are at risk for HIV or not.  Cancer screening tests  Cervical cancer screening: If you have a cervix, begin getting regular cervical cancer screening tests starting at age 21.  Breast cancer scan (mammogram): If you've ever had breasts, begin having regular mammograms starting at age 40. This is a scan to check for breast cancer.  Colon cancer screening: It is important to start screening for colon cancer at age 45.  Have a colonoscopy test every 10 years (or more often if you're at risk) Or, ask your provider about stool tests like a FIT test every year or Cologuard test every 3 years.  To learn more about your testing options, visit:   .  For help making a decision, visit:   https://bit.ly/yd01392.  Prostate cancer screening test: If you have a prostate, ask your care team if a prostate cancer screening test (PSA) at age 55 is right for you.  Lung cancer screening: If you are a current or former smoker ages 50 to 80, ask your care team if ongoing lung cancer screenings are right for you.  For informational purposes only. Not to replace the advice of your health care provider. Copyright   2023 [employer]. All rights reserved. Clinically reviewed by the [facility]. [employer] 199102 - REV 01/24.

## 2024-10-23 LAB
CHOLEST SERPL-MCNC: 206 MG/DL
FASTING STATUS PATIENT QL REPORTED: ABNORMAL
HDLC SERPL-MCNC: 64 MG/DL
LDLC SERPL CALC-MCNC: 110 MG/DL
NONHDLC SERPL-MCNC: 142 MG/DL
TRIGL SERPL-MCNC: 160 MG/DL
TSH SERPL DL<=0.005 MIU/L-ACNC: 1.56 UIU/ML (ref 0.3–4.2)
VIT D+METAB SERPL-MCNC: 28 NG/ML (ref 20–50)

## 2024-11-04 ENCOUNTER — HOSPITAL ENCOUNTER (OUTPATIENT)
Dept: MAMMOGRAPHY | Facility: CLINIC | Age: 79
Discharge: HOME | End: 2024-11-04
Attending: FAMILY MEDICINE | Admitting: FAMILY MEDICINE
Payer: COMMERCIAL

## 2024-11-04 DIAGNOSIS — Z12.31 VISIT FOR SCREENING MAMMOGRAM: ICD-10-CM

## 2024-11-04 PROCEDURE — 77063 BREAST TOMOSYNTHESIS BI: CPT

## 2024-11-07 ENCOUNTER — HOSPITAL ENCOUNTER (OUTPATIENT)
Dept: MAMMOGRAPHY | Facility: CLINIC | Age: 79
Discharge: HOME | End: 2024-11-07
Attending: FAMILY MEDICINE
Payer: COMMERCIAL

## 2024-11-07 DIAGNOSIS — R92.8 ABNORMAL MAMMOGRAM: ICD-10-CM

## 2024-11-07 PROCEDURE — 77065 DX MAMMO INCL CAD UNI: CPT | Mod: LT

## 2024-11-07 PROCEDURE — 76642 ULTRASOUND BREAST LIMITED: CPT | Mod: LT

## 2024-11-07 NOTE — LETTER
Nithya Manuel  [address] [facility] [address]            November 7, 2024      Date of Exam: 11/7/2024      Dear Nithya:    Thank you for your recent visit.    Breast Imaging Result: Based on your recent breast imaging, you have a suspicious area that usually requires a biopsy, at which time a small tissue sample would be taken from your breast.      If you have already made these arrangements, please disregard this letter.    Your breast tissue is not dense:  Breast tissue can be either dense or not dense. Dense tissue makes it harder to find breast cancer on a mammogram and also raises the risk of developing breast cancer.  Your breast tissue is not dense. Talk to your healthcare provider about breast density, risks for breast cancer, and your individual situation.    A report of your breast imaging results was sent to: Kristine Bridges    Your breast imaging will become part of your medical file here at [facility] for at least 10 years. You are responsible for informing any new health care team or breast imaging facility of the date and location of this examination.    We appreciate the opportunity to participate in your health care.    Sincerely,  Phil Gregorio MD   [facility]

## 2024-11-07 NOTE — PROGRESS NOTES
Radiologist, Dr Phil Gregorio, recommends stereotactic guided left breast biopsy.     While pt was at [facility], I scheduled pt at [facility], [address] , [place] , [place], [place]. [phone]. Appt: Thursday, 11/14/24, arrival at 12:45pm for 1:00pm appt.      I reviewed the procedure and the written pre and post breast biopsy patient handouts with patient, and I gave patient the written pre and post biopsy patient handouts to take home.     Pt verbalizes understanding of procedure and appt location, date, and time. Calls welcomed.    Janet Gilmore, RN, BSN, [facility]  [facility]

## 2024-11-14 ENCOUNTER — HOSPITAL ENCOUNTER (OUTPATIENT)
Dept: MAMMOGRAPHY | Facility: CLINIC | Age: 79
Discharge: HOME | End: 2024-11-14
Attending: FAMILY MEDICINE
Payer: COMMERCIAL

## 2024-11-14 DIAGNOSIS — R92.8 ABNORMAL MAMMOGRAM: ICD-10-CM

## 2024-11-14 PROCEDURE — 272N000715 MA STEREOTACTIC BREAST BIOPSY VACUUM LT

## 2024-11-14 PROCEDURE — 250N000009 HC RX 250: Performed by: FAMILY MEDICINE

## 2024-11-14 PROCEDURE — 250N000011 HC RX IP 250 OP 636: Performed by: FAMILY MEDICINE

## 2024-11-14 PROCEDURE — 88305 TISSUE EXAM BY PATHOLOGIST: CPT | Mod: TC | Performed by: FAMILY MEDICINE

## 2024-11-14 RX ORDER — LIDOCAINE HYDROCHLORIDE AND EPINEPHRINE 10; 10 MG/ML; UG/ML
10 INJECTION, SOLUTION INFILTRATION; PERINEURAL ONCE
Status: COMPLETED | OUTPATIENT
Start: 2024-11-14 | End: 2024-11-14

## 2024-11-14 RX ADMIN — LIDOCAINE HYDROCHLORIDE,EPINEPHRINE BITARTRATE 10 ML: 10; .01 INJECTION, SOLUTION INFILTRATION; PERINEURAL at 14:03

## 2024-11-14 RX ADMIN — LIDOCAINE HYDROCHLORIDE 10 ML: 10 SOLUTION INTRAVENOUS at 14:02

## 2024-11-14 NOTE — PROGRESS NOTES
Vivian arrived at [facility]. I escorted pt to the Breast [place] consult room. Pt was identified using full name and . Wristband is on pt wrist. Pt was able to state which procedure and correct side breast biopsy was occurring today. I reviewed the consent form with the pt and pt was given the consent form to review before signing.     I reviewed post breast biopsy care. Pt acknowledged understanding of post biopsy care. Pt was given written post breast biopsy care handout to take home.    I explained results are expected in 3-5 business days and Breast [facility] RN will call pt at number pt provided today. Pt has active MyChart, therefore, I explained pathology result alert may occur and reach pt before RN can call to discuss results, and it is up to pt to decide to view results or wait for RN call. Pt verbalizes understanding.    Pt had no questions or concerns.     I escorted pt to the changing room and pt changed into gown. Pt placed personal belongings in a locker and pt has the duncan. I escorted pt to sub-waiting area to sit until procedure room was ready and I would come get her. I informed mammo techs, Fallon Hughes and Afia Quintana, pt was ready and they were ready for pt, so I escorted pt to Mammo Room 3 and staff introductions were performed.     Janet Gilmore, RN, BSN, CBCN  [facility]

## 2024-11-14 NOTE — DISCHARGE INSTRUCTIONS
Pt was instructed to leave ace-wrap on until at least bedtime or to remove in morning if able to tolerate sleeping with it in place. Pt was instructed to use cool packs under ace-wrap layers and apply per instruction hand out. Pt was given copy of instructions. Calls welcomed. Pt verbalized understanding and acceptance of plan, and verbalized appreciation for care she received today.

## 2024-11-15 LAB
PATH REPORT.COMMENTS IMP SPEC: NORMAL
PATH REPORT.FINAL DX SPEC: NORMAL
PATH REPORT.GROSS SPEC: NORMAL
PATH REPORT.MICROSCOPIC SPEC OTHER STN: NORMAL
PHOTO IMAGE: NORMAL

## 2024-11-18 ENCOUNTER — TELEPHONE (OUTPATIENT)
Dept: MAMMOGRAPHY | Facility: CLINIC | Age: 79
End: 2024-11-18
Payer: COMMERCIAL

## 2024-11-18 NOTE — Clinical Note
Hello,   Pt has been informed of breast biopsy results and follow-up recommendation.   Thank you, Janet MACK RN, BSN

## 2024-11-18 NOTE — TELEPHONE ENCOUNTER
Following Radiologist, Dr Leslie Mohr's review of 24, left breast biopsy pathology and imaging, and at Dr. Mohr's request, I telephoned patient, confirming pt identity using name and , to review the benign pathology results and Radiologist's recommendation to return in 6 mos for follow-up diagnostic mammogram.    Pt verbalizes understanding of information provided during this call and acceptance of plan. I welcomed calls if any questions or concerns.     Pt verbalized appreciation for the care she received during her visit for the biopsy.     I will CC this note to ordering provider, Dr Kristine Bridges.    Janet Gilmore, RN, BSN, [facility]

## 2024-12-30 ENCOUNTER — TRANSFERRED RECORDS (OUTPATIENT)
Dept: HEALTH INFORMATION MANAGEMENT | Facility: CLINIC | Age: 79
End: 2024-12-30
Payer: COMMERCIAL

## 2025-02-12 ENCOUNTER — HOSPITAL ENCOUNTER (OUTPATIENT)
Dept: ULTRASOUND IMAGING | Facility: CLINIC | Age: 80
Discharge: HOME | End: 2025-02-12
Attending: FAMILY MEDICINE
Payer: COMMERCIAL

## 2025-02-12 DIAGNOSIS — D11.0 BENIGN TUMOR OF PAROTID GLAND: ICD-10-CM

## 2025-02-12 PROCEDURE — 76536 US EXAM OF HEAD AND NECK: CPT

## 2025-03-17 ENCOUNTER — TRANSFERRED RECORDS (OUTPATIENT)
Dept: HEALTH INFORMATION MANAGEMENT | Facility: CLINIC | Age: 80
End: 2025-03-17
Payer: COMMERCIAL

## 2025-05-05 ENCOUNTER — TRANSFERRED RECORDS (OUTPATIENT)
Dept: HEALTH INFORMATION MANAGEMENT | Facility: CLINIC | Age: 80
End: 2025-05-05
Payer: COMMERCIAL

## 2025-05-15 ENCOUNTER — TRANSFERRED RECORDS (OUTPATIENT)
Dept: HEALTH INFORMATION MANAGEMENT | Facility: CLINIC | Age: 80
End: 2025-05-15

## 2025-05-15 ENCOUNTER — HOSPITAL ENCOUNTER (OUTPATIENT)
Dept: MAMMOGRAPHY | Facility: CLINIC | Age: 80
End: 2025-05-15
Attending: FAMILY MEDICINE
Payer: COMMERCIAL

## 2025-05-15 DIAGNOSIS — N64.89 BREAST ASYMMETRY: ICD-10-CM

## 2025-05-15 PROCEDURE — 77065 DX MAMMO INCL CAD UNI: CPT | Mod: LT

## 2025-07-16 ENCOUNTER — OFFICE VISIT (OUTPATIENT)
Dept: FAMILY MEDICINE | Facility: CLINIC | Age: 80
End: 2025-07-16
Payer: COMMERCIAL

## 2025-07-16 VITALS
SYSTOLIC BLOOD PRESSURE: 134 MMHG | RESPIRATION RATE: 18 BRPM | TEMPERATURE: 98.1 F | BODY MASS INDEX: 24.27 KG/M2 | HEIGHT: 63 IN | OXYGEN SATURATION: 95 % | WEIGHT: 137 LBS | DIASTOLIC BLOOD PRESSURE: 76 MMHG | HEART RATE: 80 BPM

## 2025-07-16 DIAGNOSIS — E78.5 DYSLIPIDEMIA: ICD-10-CM

## 2025-07-16 DIAGNOSIS — M16.11 PRIMARY OSTEOARTHRITIS OF RIGHT HIP: ICD-10-CM

## 2025-07-16 DIAGNOSIS — R73.01 IMPAIRED FASTING GLUCOSE: ICD-10-CM

## 2025-07-16 DIAGNOSIS — Z01.818 PREOP GENERAL PHYSICAL EXAM: Primary | ICD-10-CM

## 2025-07-16 DIAGNOSIS — E06.3 HYPOTHYROIDISM DUE TO HASHIMOTO'S THYROIDITIS: ICD-10-CM

## 2025-07-16 DIAGNOSIS — Z23 NEED FOR VACCINATION: ICD-10-CM

## 2025-07-16 LAB
ANION GAP SERPL CALCULATED.3IONS-SCNC: 9 MMOL/L (ref 7–15)
BUN SERPL-MCNC: 19.5 MG/DL (ref 8–23)
CALCIUM SERPL-MCNC: 9.3 MG/DL (ref 8.8–10.4)
CHLORIDE SERPL-SCNC: 103 MMOL/L (ref 98–107)
CREAT SERPL-MCNC: 0.9 MG/DL (ref 0.51–0.95)
EGFRCR SERPLBLD CKD-EPI 2021: 64 ML/MIN/1.73M2
ERYTHROCYTE [DISTWIDTH] IN BLOOD BY AUTOMATED COUNT: 13.2 % (ref 10–15)
EST. AVERAGE GLUCOSE BLD GHB EST-MCNC: 114 MG/DL
GLUCOSE SERPL-MCNC: 122 MG/DL (ref 70–99)
HBA1C MFR BLD: 5.6 % (ref 0–5.6)
HCO3 SERPL-SCNC: 26 MMOL/L (ref 22–29)
HCT VFR BLD AUTO: 37.3 % (ref 35–47)
HGB BLD-MCNC: 12.5 G/DL (ref 11.7–15.7)
MCH RBC QN AUTO: 29.8 PG (ref 26.5–33)
MCHC RBC AUTO-ENTMCNC: 33.5 G/DL (ref 31.5–36.5)
MCV RBC AUTO: 89 FL (ref 78–100)
PLATELET # BLD AUTO: 257 10E3/UL (ref 150–450)
POTASSIUM SERPL-SCNC: 4.5 MMOL/L (ref 3.4–5.3)
RBC # BLD AUTO: 4.2 10E6/UL (ref 3.8–5.2)
SODIUM SERPL-SCNC: 138 MMOL/L (ref 135–145)
WBC # BLD AUTO: 10 10E3/UL (ref 4–11)

## 2025-07-16 PROCEDURE — 85027 COMPLETE CBC AUTOMATED: CPT | Performed by: FAMILY MEDICINE

## 2025-07-16 PROCEDURE — 3075F SYST BP GE 130 - 139MM HG: CPT | Performed by: FAMILY MEDICINE

## 2025-07-16 PROCEDURE — 99214 OFFICE O/P EST MOD 30 MIN: CPT | Mod: 25 | Performed by: FAMILY MEDICINE

## 2025-07-16 PROCEDURE — 36415 COLL VENOUS BLD VENIPUNCTURE: CPT | Performed by: FAMILY MEDICINE

## 2025-07-16 PROCEDURE — G2211 COMPLEX E/M VISIT ADD ON: HCPCS | Performed by: FAMILY MEDICINE

## 2025-07-16 PROCEDURE — 1126F AMNT PAIN NOTED NONE PRSNT: CPT | Performed by: FAMILY MEDICINE

## 2025-07-16 PROCEDURE — 3078F DIAST BP <80 MM HG: CPT | Performed by: FAMILY MEDICINE

## 2025-07-16 PROCEDURE — 80048 BASIC METABOLIC PNL TOTAL CA: CPT | Performed by: FAMILY MEDICINE

## 2025-07-16 PROCEDURE — 83036 HEMOGLOBIN GLYCOSYLATED A1C: CPT | Performed by: FAMILY MEDICINE

## 2025-07-16 ASSESSMENT — PAIN SCALES - GENERAL: PAINLEVEL_OUTOF10: NO PAIN (0)

## 2025-07-16 NOTE — PROGRESS NOTES
Preoperative Evaluation  [facility]  [address]  [facility] [address]  Phone: [phone]  Fax: [phone]  Primary Provider: Kristine Bridges MD  Pre-op Performing Provider: Kristine Bridges MD  Jul 16, 2025 7/14/2025   Surgical Information   What procedure is being done? Right hip replacement   Facility or Hospital where procedure/surgery will be performed: [facility]   Who is doing the procedure / surgery? Dr. Daniel Veloz   Date of surgery / procedure: August 4, 2025   Time of surgery / procedure: Afternoon   Where do you plan to recover after surgery? at home with family     Fax number for surgical facility: Note does not need to be faxed, will be available electronically in Epic.    Assessment & Plan     The proposed surgical procedure is considered INTERMEDIATE risk.    Preop general physical exam  Surgical risks include controlled dyslipidemia, impaired fasting glucose without diabetes.  At this time her health conditions are adequately managed.  She may proceed with surgery as scheduled without further clinical clarification or evaluation and may proceed with choice of anesthesia.  Advised Tdap administration at the pharmacy prior to surgery.  Will check basic metabolic panel and CBC for baseline.  - Tdap, tetanus-diptheria-acell pertussis, (BOOSTRIX) 5-2.5-18.5 LF-MCG/0.5 SOCORRO injection; Inject 0.5 mLs into the muscle once for 1 dose.  - Basic metabolic panel; Future  - CBC with platelets; Future    Primary osteoarthritis of right hip  Surgery scheduled.    Dyslipidemia  Controlled through lifestyle.  Encourage healthy lifestyle habits.    Impaired Fasting Glucose  Will reassess A1c today, no prior history of diabetes.  - Hemoglobin A1c; Future    Hypothyroidism due to Hashimoto's thyroiditis  She remains on levothyroxine, normal TSH.    Need for vaccination  Advised that she receive Tdap at the pharmacy as it is not covered in clinic.    The  longitudinal plan of care for the diagnosis(es)/condition(s) as documented were addressed during this visit. Due to the added complexity in care, I will continue to support Vivian in the subsequent management and with ongoing continuity of care.        - No identified additional risk factors other than previously addressed    Antiplatelet or Anticoagulation Medication Instructions   - aspirin: Discontinue aspirin 7 days prior to surgery.  No indication for resumption after surgery except as recommended by surgery team.    Additional Medication Instructions  Take all scheduled medications on the day of surgery    Using diazepam sparingly as needed for sleep, does not intend to take the night prior to surgery.    Recommendation  Approval given to proceed with proposed procedure, without further diagnostic evaluation.        Katey Park is a 80 year old, presenting for the following:  Pre-Op Exam (8/4 right hip replacement)          7/16/2025     1:22 PM   Additional Questions   Roomed by nl     HPI: Requiring right hip replacement due to ongoing pain and dysfunction.  History of hypothyroidism managed with levothyroxine, normal TSH in October.  History of impaired fasting glucose with most recent A1c of 6.0% in October.  Dyslipidemia has been managed with healthy lifestyle habits.  She takes a baby aspirin daily for prevention of colon polyps, has no other indication for this.  Taking diazepam sparingly as needed at night to help with sleep, has been doing this for decades and has been very compliant with it, notes that most recent refill she will develop headache after.         7/14/2025   Pre-Op Questionnaire   Have you ever had a heart attack or stroke? No   Have you ever had surgery on your heart or blood vessels, such as a stent placement, a coronary artery bypass, or surgery on an artery in your head, neck, heart, or legs? No   Do you have chest pain with activity? No   Do you have a history of heart failure?  No   Do you currently have a cold, bronchitis or symptoms of other infection? No   Do you have a cough, shortness of breath, or wheezing? No   Do you or anyone in your family have previous history of blood clots? No   Do you or does anyone in your family have a serious bleeding problem such as prolonged bleeding following surgeries or cuts? No   Have you ever had problems with anemia or been told to take iron pills? No   Have you had any abnormal blood loss such as black, tarry or bloody stools, or abnormal vaginal bleeding? No   Have you ever had a blood transfusion? No   Are you willing to have a blood transfusion if it is medically needed before, during, or after your surgery? Yes   Have you or any of your relatives ever had problems with anesthesia? No   Do you have sleep apnea, excessive snoring or daytime drowsiness? No   Do you have any artifical heart valves or other implanted medical devices like a pacemaker, defibrillator, or continuous glucose monitor? No   Do you have artificial joints? No   Are you allergic to latex? No     Advance Care Planning    Patient states has Health Care Directive and will send to [employer].    Preoperative Review of    reviewed - controlled substances reflected in medication list. 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     Priority: Medium    Vitamin D deficiency 11/14/2021     Priority: Medium    Osteopenia, unspecified location 10/27/2021     Priority: Medium    Diverticular disease of large intestine 10/18/2018     Priority: Medium    Insomnia      Priority: Medium     Created by Conversion        Impaired Fasting Glucose      Priority: Medium     Created by Conversion        Dyslipidemia      Priority: Medium     Created by Conversion         "  Past Medical History:   Diagnosis Date    Thyroid disease 2009     Past Surgical History:   Procedure Laterality Date    abdominal laparoscopy      foreign body removal (bristle of grill brush)    COLONOSCOPY  September 2022    HEMICOLECTOMY, RIGHT, ROBOT-ASSISTED, USING [facility] Right july 2019    LAPAROTOMY EXPLORATORY      Due to infertility    OVARIAN CYST SURGERY Right 12/26/2013    [facility]    TONSILLECTOMY  1947    TUBAL LIGATION       Current Outpatient Medications   Medication Sig Dispense Refill    aspirin 81 MG EC tablet Take 81 mg by mouth daily      diazepam (VALIUM) 2 MG tablet Take 1 tablet (2 mg) by mouth nightly as needed for anxiety. 30 tablet 1    levothyroxine (SYNTHROID/LEVOTHROID) 100 MCG tablet Take 1 tablet (100 mcg) by mouth daily. 90 tablet 4    Tdap, tetanus-diptheria-acell pertussis, (BOOSTRIX) 5-2.5-18.5 LF-MCG/0.5 SOCORRO injection Inject 0.5 mLs into the muscle once for 1 dose. 0.5 mL 0       Allergies   Allergen Reactions    Penicillins Hives    Sulfa (Sulfonamide Antibiotics) [Sulfa Antibiotics] Other (See Comments)     Not sure if its her or her son.        Social History     Tobacco Use    Smoking status: Never    Smokeless tobacco: Never   Substance Use Topics    Alcohol use: Yes     Alcohol/week: 3.0 standard drinks of alcohol     Comment: social     Family History   Problem Relation Age of Onset    Hypertension Mother     Thyroid Disease Mother     Cancer Mother     Anesthesia Reaction No family hx of      History   Drug Use No             Review of Systems  Constitutional, neuro, ENT, endocrine, pulmonary, cardiac, gastrointestinal, genitourinary, musculoskeletal, integument and psychiatric systems are negative, except as otherwise noted.    Objective    /76   Pulse 80   Temp 98.1  F (36.7  C)   Resp 18   Ht 1.6 m (5' 3\")   Wt 62.1 kg (137 lb)   SpO2 95%   BMI 24.27 kg/m     Estimated body mass index is 24.27 kg/m  as calculated from the following:    Height " "as of this encounter: 1.6 m (5' 3\").    Weight as of this encounter: 62.1 kg (137 lb).  Physical Exam  GENERAL: alert and no distress  EYES: Eyes grossly normal to inspection, PERRL and conjunctivae and sclerae normal  HENT: ear canals and TM's normal, nose and mouth without ulcers or lesions  NECK: no adenopathy, no asymmetry, masses, or scars  RESP: lungs clear to auscultation - no rales, rhonchi or wheezes  CV: regular rate and rhythm, normal S1 S2, no S3 or S4, no murmur, click or rub, no peripheral edema  ABDOMEN: soft, nontender, no hepatosplenomegaly, no masses and bowel sounds normal  MS: no gross musculoskeletal defects noted, no edema  SKIN: no suspicious lesions or rashes  NEURO: Normal strength and tone, mentation intact and speech normal  PSYCH: mentation appears normal, affect normal/bright    Recent Labs   Lab Test 10/22/24  1218   A1C 6.0*        Diagnostics  Labs pending at this time.  Results will be reviewed when available.   No EKG required, no history of coronary heart disease, significant arrhythmia, peripheral arterial disease or other structural heart disease.    Revised Cardiac Risk Index (RCRI)  The patient has the following serious cardiovascular risks for perioperative complications:   - No serious cardiac risks = 0 points     RCRI Interpretation: 0 points: Class I (very low risk - 0.4% complication rate)         Signed Electronically by: Kristine Bridges MD  A copy of this evaluation report is provided to the requesting physician.         "

## 2025-08-03 ENCOUNTER — ANESTHESIA EVENT (OUTPATIENT)
Dept: SURGERY | Facility: CLINIC | Age: 80
End: 2025-08-03
Payer: COMMERCIAL

## 2025-08-04 ENCOUNTER — APPOINTMENT (OUTPATIENT)
Dept: RADIOLOGY | Facility: CLINIC | Age: 80
End: 2025-08-04
Attending: ORTHOPAEDIC SURGERY
Payer: COMMERCIAL

## 2025-08-04 ENCOUNTER — ANESTHESIA (OUTPATIENT)
Dept: SURGERY | Facility: CLINIC | Age: 80
End: 2025-08-04
Payer: COMMERCIAL

## 2025-08-04 ENCOUNTER — APPOINTMENT (OUTPATIENT)
Dept: ULTRASOUND IMAGING | Facility: CLINIC | Age: 80
End: 2025-08-04
Payer: COMMERCIAL

## 2025-08-04 ENCOUNTER — HOSPITAL ENCOUNTER (OUTPATIENT)
Facility: CLINIC | Age: 80
Discharge: HOME | End: 2025-08-05
Attending: ORTHOPAEDIC SURGERY | Admitting: ORTHOPAEDIC SURGERY
Payer: COMMERCIAL

## 2025-08-04 DIAGNOSIS — M16.11 PRIMARY OSTEOARTHRITIS OF RIGHT HIP: Primary | ICD-10-CM

## 2025-08-04 PROBLEM — E06.3 HYPOTHYROIDISM DUE TO HASHIMOTO'S THYROIDITIS: Status: ACTIVE | Noted: 2024-10-22

## 2025-08-04 PROBLEM — M16.9 OA (OSTEOARTHRITIS) OF HIP: Status: ACTIVE | Noted: 2025-08-04

## 2025-08-04 LAB — GLUCOSE BLDC GLUCOMTR-MCNC: 96 MG/DL (ref 70–99)

## 2025-08-04 PROCEDURE — 999N000141 HC STATISTIC PRE-PROCEDURE NURSING ASSESSMENT: Performed by: ORTHOPAEDIC SURGERY

## 2025-08-04 PROCEDURE — 710N000010 HC RECOVERY PHASE 1, LEVEL 2, PER MIN: Performed by: ORTHOPAEDIC SURGERY

## 2025-08-04 PROCEDURE — 258N000003 HC RX IP 258 OP 636: Performed by: ANESTHESIOLOGY

## 2025-08-04 PROCEDURE — 82962 GLUCOSE BLOOD TEST: CPT

## 2025-08-04 PROCEDURE — 250N000011 HC RX IP 250 OP 636: Performed by: ANESTHESIOLOGY

## 2025-08-04 PROCEDURE — 258N000003 HC RX IP 258 OP 636: Performed by: ORTHOPAEDIC SURGERY

## 2025-08-04 PROCEDURE — 250N000013 HC RX MED GY IP 250 OP 250 PS 637: Performed by: PHYSICIAN ASSISTANT

## 2025-08-04 PROCEDURE — 360N000085 HC SURGERY LEVEL 5 W/ FLUORO, PER MIN: Performed by: ORTHOPAEDIC SURGERY

## 2025-08-04 PROCEDURE — 250N000009 HC RX 250: Performed by: PHYSICIAN ASSISTANT

## 2025-08-04 PROCEDURE — 258N000003 HC RX IP 258 OP 636: Performed by: NURSE ANESTHETIST, CERTIFIED REGISTERED

## 2025-08-04 PROCEDURE — 250N000011 HC RX IP 250 OP 636: Performed by: NURSE ANESTHETIST, CERTIFIED REGISTERED

## 2025-08-04 PROCEDURE — 99205 OFFICE O/P NEW HI 60 MIN: CPT | Mod: FS

## 2025-08-04 PROCEDURE — 250N000011 HC RX IP 250 OP 636: Performed by: ORTHOPAEDIC SURGERY

## 2025-08-04 PROCEDURE — 250N000013 HC RX MED GY IP 250 OP 250 PS 637: Performed by: ORTHOPAEDIC SURGERY

## 2025-08-04 PROCEDURE — 272N000001 HC OR GENERAL SUPPLY STERILE: Performed by: ORTHOPAEDIC SURGERY

## 2025-08-04 PROCEDURE — 370N000017 HC ANESTHESIA TECHNICAL FEE, PER MIN: Performed by: ORTHOPAEDIC SURGERY

## 2025-08-04 PROCEDURE — C1713 ANCHOR/SCREW BN/BN,TIS/BN: HCPCS | Performed by: ORTHOPAEDIC SURGERY

## 2025-08-04 PROCEDURE — 99207 PR APP CREDIT; MD BILLING SHARED VISIT: CPT | Mod: FS | Performed by: EMERGENCY MEDICINE

## 2025-08-04 PROCEDURE — 250N000009 HC RX 250: Performed by: NURSE ANESTHETIST, CERTIFIED REGISTERED

## 2025-08-04 PROCEDURE — 999N000180 XR SURGERY CARM FLUORO LESS THAN 5 MIN

## 2025-08-04 PROCEDURE — 250N000011 HC RX IP 250 OP 636: Performed by: PHYSICIAN ASSISTANT

## 2025-08-04 PROCEDURE — 93971 EXTREMITY STUDY: CPT | Mod: LT

## 2025-08-04 PROCEDURE — C1776 JOINT DEVICE (IMPLANTABLE): HCPCS | Performed by: ORTHOPAEDIC SURGERY

## 2025-08-04 DEVICE — IMPLANTABLE DEVICE: Type: IMPLANTABLE DEVICE | Site: HIP | Status: FUNCTIONAL

## 2025-08-04 DEVICE — IMP STEM FEM DEPUY ACTIS STD COLLAR TPR SZ 4MM 1010-11-040: Type: IMPLANTABLE DEVICE | Site: HIP | Status: FUNCTIONAL

## 2025-08-04 DEVICE — IMP SCR BONE CAN ACE 6.5X30MM 1217-30-500: Type: IMPLANTABLE DEVICE | Site: HIP | Status: FUNCTIONAL

## 2025-08-04 RX ORDER — HYDROMORPHONE HCL IN WATER/PF 6 MG/30 ML
0.4 PATIENT CONTROLLED ANALGESIA SYRINGE INTRAVENOUS EVERY 5 MIN PRN
Status: DISCONTINUED | OUTPATIENT
Start: 2025-08-04 | End: 2025-08-04 | Stop reason: HOSPADM

## 2025-08-04 RX ORDER — PROPOFOL 10 MG/ML
INJECTION, EMULSION INTRAVENOUS CONTINUOUS PRN
Status: DISCONTINUED | OUTPATIENT
Start: 2025-08-04 | End: 2025-08-04

## 2025-08-04 RX ORDER — POLYETHYLENE GLYCOL 3350 17 G/17G
17 POWDER, FOR SOLUTION ORAL DAILY
Status: DISCONTINUED | OUTPATIENT
Start: 2025-08-05 | End: 2025-08-05 | Stop reason: HOSPADM

## 2025-08-04 RX ORDER — HYDROMORPHONE HYDROCHLORIDE 1 MG/ML
0.3 INJECTION, SOLUTION INTRAMUSCULAR; INTRAVENOUS; SUBCUTANEOUS
Status: DISCONTINUED | OUTPATIENT
Start: 2025-08-04 | End: 2025-08-04

## 2025-08-04 RX ORDER — CEFAZOLIN SODIUM 1 G/3ML
1 INJECTION, POWDER, FOR SOLUTION INTRAMUSCULAR; INTRAVENOUS EVERY 8 HOURS
Status: COMPLETED | OUTPATIENT
Start: 2025-08-04 | End: 2025-08-05

## 2025-08-04 RX ORDER — OXYCODONE HYDROCHLORIDE 5 MG/1
5 TABLET ORAL EVERY 4 HOURS PRN
Status: DISCONTINUED | OUTPATIENT
Start: 2025-08-04 | End: 2025-08-05 | Stop reason: HOSPADM

## 2025-08-04 RX ORDER — NALOXONE HYDROCHLORIDE 0.4 MG/ML
0.4 INJECTION, SOLUTION INTRAMUSCULAR; INTRAVENOUS; SUBCUTANEOUS
Status: DISCONTINUED | OUTPATIENT
Start: 2025-08-04 | End: 2025-08-05 | Stop reason: HOSPADM

## 2025-08-04 RX ORDER — ACETAMINOPHEN 325 MG/1
650 TABLET ORAL ONCE
Status: DISCONTINUED | OUTPATIENT
Start: 2025-08-04 | End: 2025-08-04

## 2025-08-04 RX ORDER — HYDROMORPHONE HYDROCHLORIDE 1 MG/ML
0.5 INJECTION, SOLUTION INTRAMUSCULAR; INTRAVENOUS; SUBCUTANEOUS
Status: DISCONTINUED | OUTPATIENT
Start: 2025-08-04 | End: 2025-08-05 | Stop reason: HOSPADM

## 2025-08-04 RX ORDER — LEVOTHYROXINE SODIUM 50 UG/1
100 TABLET ORAL DAILY
Status: DISCONTINUED | OUTPATIENT
Start: 2025-08-05 | End: 2025-08-05 | Stop reason: HOSPADM

## 2025-08-04 RX ORDER — HYDROMORPHONE HYDROCHLORIDE 1 MG/ML
0.5 INJECTION, SOLUTION INTRAMUSCULAR; INTRAVENOUS; SUBCUTANEOUS
Status: DISCONTINUED | OUTPATIENT
Start: 2025-08-04 | End: 2025-08-04

## 2025-08-04 RX ORDER — HYDROMORPHONE HCL IN WATER/PF 6 MG/30 ML
0.2 PATIENT CONTROLLED ANALGESIA SYRINGE INTRAVENOUS EVERY 5 MIN PRN
Status: DISCONTINUED | OUTPATIENT
Start: 2025-08-04 | End: 2025-08-04 | Stop reason: HOSPADM

## 2025-08-04 RX ORDER — LIDOCAINE 40 MG/G
CREAM TOPICAL
Status: DISCONTINUED | OUTPATIENT
Start: 2025-08-04 | End: 2025-08-05 | Stop reason: HOSPADM

## 2025-08-04 RX ORDER — ONDANSETRON 2 MG/ML
4 INJECTION INTRAMUSCULAR; INTRAVENOUS EVERY 6 HOURS PRN
Status: DISCONTINUED | OUTPATIENT
Start: 2025-08-04 | End: 2025-08-05 | Stop reason: HOSPADM

## 2025-08-04 RX ORDER — NALOXONE HYDROCHLORIDE 0.4 MG/ML
0.1 INJECTION, SOLUTION INTRAMUSCULAR; INTRAVENOUS; SUBCUTANEOUS
Status: DISCONTINUED | OUTPATIENT
Start: 2025-08-04 | End: 2025-08-04 | Stop reason: HOSPADM

## 2025-08-04 RX ORDER — NALOXONE HYDROCHLORIDE 0.4 MG/ML
0.2 INJECTION, SOLUTION INTRAMUSCULAR; INTRAVENOUS; SUBCUTANEOUS
Status: DISCONTINUED | OUTPATIENT
Start: 2025-08-04 | End: 2025-08-05 | Stop reason: HOSPADM

## 2025-08-04 RX ORDER — TRANEXAMIC ACID 650 MG/1
1950 TABLET ORAL ONCE
Status: COMPLETED | OUTPATIENT
Start: 2025-08-04 | End: 2025-08-04

## 2025-08-04 RX ORDER — SODIUM CHLORIDE, SODIUM LACTATE, POTASSIUM CHLORIDE, CALCIUM CHLORIDE 600; 310; 30; 20 MG/100ML; MG/100ML; MG/100ML; MG/100ML
INJECTION, SOLUTION INTRAVENOUS CONTINUOUS
Status: DISCONTINUED | OUTPATIENT
Start: 2025-08-04 | End: 2025-08-04 | Stop reason: HOSPADM

## 2025-08-04 RX ORDER — BUPIVACAINE HYDROCHLORIDE 7.5 MG/ML
INJECTION, SOLUTION INTRASPINAL
Status: COMPLETED | OUTPATIENT
Start: 2025-08-04 | End: 2025-08-04

## 2025-08-04 RX ORDER — PROCHLORPERAZINE MALEATE 5 MG/1
5 TABLET ORAL EVERY 6 HOURS PRN
Status: DISCONTINUED | OUTPATIENT
Start: 2025-08-04 | End: 2025-08-05 | Stop reason: HOSPADM

## 2025-08-04 RX ORDER — ONDANSETRON 4 MG/1
4 TABLET, ORALLY DISINTEGRATING ORAL EVERY 30 MIN PRN
Status: DISCONTINUED | OUTPATIENT
Start: 2025-08-04 | End: 2025-08-04 | Stop reason: HOSPADM

## 2025-08-04 RX ORDER — ACETAMINOPHEN 325 MG/1
975 TABLET ORAL ONCE
Status: COMPLETED | OUTPATIENT
Start: 2025-08-04 | End: 2025-08-04

## 2025-08-04 RX ORDER — DEXAMETHASONE SODIUM PHOSPHATE 10 MG/ML
INJECTION, SOLUTION INTRAMUSCULAR; INTRAVENOUS PRN
Status: DISCONTINUED | OUTPATIENT
Start: 2025-08-04 | End: 2025-08-04

## 2025-08-04 RX ORDER — AMOXICILLIN 250 MG
1 CAPSULE ORAL 2 TIMES DAILY
Status: DISCONTINUED | OUTPATIENT
Start: 2025-08-04 | End: 2025-08-05 | Stop reason: HOSPADM

## 2025-08-04 RX ORDER — HYDROMORPHONE HCL IN WATER/PF 6 MG/30 ML
0.1 PATIENT CONTROLLED ANALGESIA SYRINGE INTRAVENOUS EVERY 4 HOURS PRN
Status: DISCONTINUED | OUTPATIENT
Start: 2025-08-04 | End: 2025-08-04

## 2025-08-04 RX ORDER — HYDROMORPHONE HCL IN WATER/PF 6 MG/30 ML
0.2 PATIENT CONTROLLED ANALGESIA SYRINGE INTRAVENOUS EVERY 4 HOURS PRN
Status: DISCONTINUED | OUTPATIENT
Start: 2025-08-04 | End: 2025-08-04

## 2025-08-04 RX ORDER — CEFAZOLIN SODIUM/WATER 2 G/20 ML
2 SYRINGE (ML) INTRAVENOUS SEE ADMIN INSTRUCTIONS
Status: DISCONTINUED | OUTPATIENT
Start: 2025-08-04 | End: 2025-08-04

## 2025-08-04 RX ORDER — ONDANSETRON 2 MG/ML
4 INJECTION INTRAMUSCULAR; INTRAVENOUS EVERY 30 MIN PRN
Status: DISCONTINUED | OUTPATIENT
Start: 2025-08-04 | End: 2025-08-04 | Stop reason: HOSPADM

## 2025-08-04 RX ORDER — DIAZEPAM 2 MG/1
2 TABLET ORAL
Status: DISCONTINUED | OUTPATIENT
Start: 2025-08-04 | End: 2025-08-05 | Stop reason: HOSPADM

## 2025-08-04 RX ORDER — ONDANSETRON 4 MG/1
4 TABLET, ORALLY DISINTEGRATING ORAL EVERY 6 HOURS PRN
Status: DISCONTINUED | OUTPATIENT
Start: 2025-08-04 | End: 2025-08-05 | Stop reason: HOSPADM

## 2025-08-04 RX ORDER — ASPIRIN 81 MG/1
81 TABLET ORAL 2 TIMES DAILY
Status: DISCONTINUED | OUTPATIENT
Start: 2025-08-04 | End: 2025-08-05 | Stop reason: HOSPADM

## 2025-08-04 RX ORDER — FENTANYL CITRATE 0.05 MG/ML
INJECTION, SOLUTION INTRAMUSCULAR; INTRAVENOUS PRN
Status: DISCONTINUED | OUTPATIENT
Start: 2025-08-04 | End: 2025-08-04

## 2025-08-04 RX ORDER — ACETAMINOPHEN 325 MG/1
975 TABLET ORAL EVERY 8 HOURS
Status: DISCONTINUED | OUTPATIENT
Start: 2025-08-04 | End: 2025-08-05 | Stop reason: HOSPADM

## 2025-08-04 RX ORDER — DEXAMETHASONE SODIUM PHOSPHATE 4 MG/ML
4 INJECTION, SOLUTION INTRA-ARTICULAR; INTRALESIONAL; INTRAMUSCULAR; INTRAVENOUS; SOFT TISSUE
Status: DISCONTINUED | OUTPATIENT
Start: 2025-08-04 | End: 2025-08-04 | Stop reason: HOSPADM

## 2025-08-04 RX ORDER — CEFAZOLIN SODIUM/WATER 2 G/20 ML
2 SYRINGE (ML) INTRAVENOUS
Status: COMPLETED | OUTPATIENT
Start: 2025-08-04 | End: 2025-08-04

## 2025-08-04 RX ORDER — FENTANYL CITRATE 50 UG/ML
50 INJECTION, SOLUTION INTRAMUSCULAR; INTRAVENOUS EVERY 5 MIN PRN
Status: DISCONTINUED | OUTPATIENT
Start: 2025-08-04 | End: 2025-08-04 | Stop reason: HOSPADM

## 2025-08-04 RX ORDER — ONDANSETRON 2 MG/ML
INJECTION INTRAMUSCULAR; INTRAVENOUS PRN
Status: DISCONTINUED | OUTPATIENT
Start: 2025-08-04 | End: 2025-08-04

## 2025-08-04 RX ORDER — FENTANYL CITRATE 50 UG/ML
25 INJECTION, SOLUTION INTRAMUSCULAR; INTRAVENOUS EVERY 5 MIN PRN
Status: DISCONTINUED | OUTPATIENT
Start: 2025-08-04 | End: 2025-08-04 | Stop reason: HOSPADM

## 2025-08-04 RX ORDER — HYDROMORPHONE HYDROCHLORIDE 1 MG/ML
0.3 INJECTION, SOLUTION INTRAMUSCULAR; INTRAVENOUS; SUBCUTANEOUS
Status: DISCONTINUED | OUTPATIENT
Start: 2025-08-04 | End: 2025-08-05 | Stop reason: HOSPADM

## 2025-08-04 RX ORDER — SODIUM CHLORIDE, SODIUM LACTATE, POTASSIUM CHLORIDE, CALCIUM CHLORIDE 600; 310; 30; 20 MG/100ML; MG/100ML; MG/100ML; MG/100ML
INJECTION, SOLUTION INTRAVENOUS CONTINUOUS
Status: DISCONTINUED | OUTPATIENT
Start: 2025-08-04 | End: 2025-08-04

## 2025-08-04 RX ORDER — BISACODYL 10 MG
10 SUPPOSITORY, RECTAL RECTAL DAILY PRN
Status: DISCONTINUED | OUTPATIENT
Start: 2025-08-04 | End: 2025-08-05 | Stop reason: HOSPADM

## 2025-08-04 RX ORDER — SODIUM CHLORIDE, SODIUM LACTATE, POTASSIUM CHLORIDE, CALCIUM CHLORIDE 600; 310; 30; 20 MG/100ML; MG/100ML; MG/100ML; MG/100ML
INJECTION, SOLUTION INTRAVENOUS CONTINUOUS
Status: DISCONTINUED | OUTPATIENT
Start: 2025-08-04 | End: 2025-08-05 | Stop reason: HOSPADM

## 2025-08-04 RX ADMIN — FENTANYL CITRATE 50 MCG: 0.05 INJECTION, SOLUTION INTRAMUSCULAR; INTRAVENOUS at 12:38

## 2025-08-04 RX ADMIN — ONDANSETRON 4 MG: 2 INJECTION INTRAMUSCULAR; INTRAVENOUS at 13:12

## 2025-08-04 RX ADMIN — PROPOFOL 100 MCG/KG/MIN: 10 INJECTION, EMULSION INTRAVENOUS at 12:40

## 2025-08-04 RX ADMIN — TRANEXAMIC ACID 1950 MG: 650 TABLET ORAL at 11:18

## 2025-08-04 RX ADMIN — SODIUM CHLORIDE, SODIUM LACTATE, POTASSIUM CHLORIDE, AND CALCIUM CHLORIDE: .6; .31; .03; .02 INJECTION, SOLUTION INTRAVENOUS at 20:21

## 2025-08-04 RX ADMIN — SODIUM CHLORIDE, SODIUM LACTATE, POTASSIUM CHLORIDE, AND CALCIUM CHLORIDE: .6; .31; .03; .02 INJECTION, SOLUTION INTRAVENOUS at 11:27

## 2025-08-04 RX ADMIN — ASPIRIN 81 MG: 81 TABLET, COATED ORAL at 20:20

## 2025-08-04 RX ADMIN — SODIUM CHLORIDE, SODIUM LACTATE, POTASSIUM CHLORIDE, AND CALCIUM CHLORIDE: .6; .31; .03; .02 INJECTION, SOLUTION INTRAVENOUS at 13:55

## 2025-08-04 RX ADMIN — CEFAZOLIN 1 G: 1 INJECTION, POWDER, FOR SOLUTION INTRAMUSCULAR; INTRAVENOUS at 20:20

## 2025-08-04 RX ADMIN — ACETAMINOPHEN 975 MG: 325 TABLET ORAL at 11:17

## 2025-08-04 RX ADMIN — ACETAMINOPHEN 975 MG: 325 TABLET ORAL at 18:16

## 2025-08-04 RX ADMIN — SODIUM CHLORIDE, SODIUM LACTATE, POTASSIUM CHLORIDE, AND CALCIUM CHLORIDE: .6; .31; .03; .02 INJECTION, SOLUTION INTRAVENOUS at 14:12

## 2025-08-04 RX ADMIN — FENTANYL CITRATE 50 MCG: 0.05 INJECTION, SOLUTION INTRAMUSCULAR; INTRAVENOUS at 12:42

## 2025-08-04 RX ADMIN — Medication 2 G: at 12:34

## 2025-08-04 RX ADMIN — DEXMEDETOMIDINE HYDROCHLORIDE 8 MCG: 100 INJECTION, SOLUTION INTRAVENOUS at 12:41

## 2025-08-04 RX ADMIN — PHENYLEPHRINE HYDROCHLORIDE 0.5 MCG/KG/MIN: 10 INJECTION INTRAVENOUS at 12:52

## 2025-08-04 RX ADMIN — OXYCODONE HYDROCHLORIDE 2.5 MG: 5 TABLET ORAL at 17:30

## 2025-08-04 RX ADMIN — BUPIVACAINE HYDROCHLORIDE IN DEXTROSE 1.6 ML: 7.5 INJECTION, SOLUTION SUBARACHNOID at 12:48

## 2025-08-04 RX ADMIN — DEXAMETHASONE SODIUM PHOSPHATE 10 MG: 10 INJECTION, SOLUTION INTRAMUSCULAR; INTRAVENOUS at 12:45

## 2025-08-04 ASSESSMENT — ACTIVITIES OF DAILY LIVING (ADL)
ADLS_ACUITY_SCORE: 21
ADLS_ACUITY_SCORE: 26
ADLS_ACUITY_SCORE: 21
ADLS_ACUITY_SCORE: 19
ADLS_ACUITY_SCORE: 31
ADLS_ACUITY_SCORE: 21
ADLS_ACUITY_SCORE: 18
ADLS_ACUITY_SCORE: 19
ADLS_ACUITY_SCORE: 21
ADLS_ACUITY_SCORE: 18
ADLS_ACUITY_SCORE: 19
ADLS_ACUITY_SCORE: 18
ADLS_ACUITY_SCORE: 18

## 2025-08-04 ASSESSMENT — ENCOUNTER SYMPTOMS
PALPITATIONS: 0
CHILLS: 0
WEIGHT LOSS: 0
FREQUENCY: 0
TINGLING: 0
SEIZURES: 0
COUGH: 0
WHEEZING: 0
ABDOMINAL PAIN: 0
FEVER: 0
ORTHOPNEA: 0
DEPRESSION: 0
FLANK PAIN: 0
BLURRED VISION: 0
HEARTBURN: 0
DIZZINESS: 0
NAUSEA: 0
SHORTNESS OF BREATH: 0
WEAKNESS: 0
VOMITING: 0

## 2025-08-05 ENCOUNTER — APPOINTMENT (OUTPATIENT)
Dept: OCCUPATIONAL THERAPY | Facility: CLINIC | Age: 80
End: 2025-08-05
Attending: ORTHOPAEDIC SURGERY
Payer: COMMERCIAL

## 2025-08-05 ENCOUNTER — APPOINTMENT (OUTPATIENT)
Dept: PHYSICAL THERAPY | Facility: CLINIC | Age: 80
End: 2025-08-05
Attending: ORTHOPAEDIC SURGERY
Payer: COMMERCIAL

## 2025-08-05 VITALS
TEMPERATURE: 97.8 F | BODY MASS INDEX: 23.74 KG/M2 | HEIGHT: 63 IN | OXYGEN SATURATION: 100 % | RESPIRATION RATE: 16 BRPM | SYSTOLIC BLOOD PRESSURE: 124 MMHG | HEART RATE: 58 BPM | DIASTOLIC BLOOD PRESSURE: 58 MMHG | WEIGHT: 134 LBS

## 2025-08-05 LAB
FASTING STATUS PATIENT QL REPORTED: ABNORMAL
GLUCOSE SERPL-MCNC: 140 MG/DL (ref 70–99)
HGB BLD-MCNC: 10.4 G/DL (ref 11.7–15.7)
MCV RBC AUTO: 87 FL (ref 78–100)

## 2025-08-05 PROCEDURE — 36415 COLL VENOUS BLD VENIPUNCTURE: CPT | Performed by: ORTHOPAEDIC SURGERY

## 2025-08-05 PROCEDURE — 97110 THERAPEUTIC EXERCISES: CPT | Mod: GP

## 2025-08-05 PROCEDURE — 97116 GAIT TRAINING THERAPY: CPT | Mod: GP

## 2025-08-05 PROCEDURE — 97166 OT EVAL MOD COMPLEX 45 MIN: CPT | Mod: GO

## 2025-08-05 PROCEDURE — 250N000013 HC RX MED GY IP 250 OP 250 PS 637: Performed by: ORTHOPAEDIC SURGERY

## 2025-08-05 PROCEDURE — 85018 HEMOGLOBIN: CPT | Performed by: ORTHOPAEDIC SURGERY

## 2025-08-05 PROCEDURE — 250N000011 HC RX IP 250 OP 636: Performed by: ORTHOPAEDIC SURGERY

## 2025-08-05 PROCEDURE — 82947 ASSAY GLUCOSE BLOOD QUANT: CPT | Performed by: ORTHOPAEDIC SURGERY

## 2025-08-05 PROCEDURE — 97161 PT EVAL LOW COMPLEX 20 MIN: CPT | Mod: GP

## 2025-08-05 PROCEDURE — 97535 SELF CARE MNGMENT TRAINING: CPT | Mod: GO

## 2025-08-05 RX ORDER — ASPIRIN 81 MG/1
81 TABLET, COATED ORAL 2 TIMES DAILY
Qty: 60 TABLET | Refills: 0 | Status: SHIPPED | OUTPATIENT
Start: 2025-08-05

## 2025-08-05 RX ORDER — OXYCODONE HYDROCHLORIDE 5 MG/1
2.5-5 TABLET ORAL EVERY 4 HOURS PRN
Qty: 20 TABLET | Refills: 0 | Status: SHIPPED | OUTPATIENT
Start: 2025-08-05

## 2025-08-05 RX ORDER — ACETAMINOPHEN 500 MG
500-1000 TABLET ORAL EVERY 6 HOURS PRN
Qty: 100 TABLET | Refills: 0 | Status: SHIPPED | OUTPATIENT
Start: 2025-08-05

## 2025-08-05 RX ORDER — AMOXICILLIN 250 MG
1 CAPSULE ORAL 2 TIMES DAILY
Qty: 30 TABLET | Refills: 1 | Status: SHIPPED | OUTPATIENT
Start: 2025-08-05

## 2025-08-05 RX ADMIN — CEFAZOLIN 1 G: 1 INJECTION, POWDER, FOR SOLUTION INTRAMUSCULAR; INTRAVENOUS at 03:40

## 2025-08-05 RX ADMIN — ASPIRIN 81 MG: 81 TABLET, COATED ORAL at 09:15

## 2025-08-05 RX ADMIN — ACETAMINOPHEN 975 MG: 325 TABLET ORAL at 12:07

## 2025-08-05 RX ADMIN — OXYCODONE HYDROCHLORIDE 2.5 MG: 5 TABLET ORAL at 00:30

## 2025-08-05 RX ADMIN — OXYCODONE HYDROCHLORIDE 2.5 MG: 5 TABLET ORAL at 06:53

## 2025-08-05 RX ADMIN — ACETAMINOPHEN 975 MG: 325 TABLET ORAL at 03:40

## 2025-08-05 RX ADMIN — LEVOTHYROXINE SODIUM 100 MCG: 0.05 TABLET ORAL at 06:53

## 2025-08-05 RX ADMIN — OXYCODONE HYDROCHLORIDE 2.5 MG: 5 TABLET ORAL at 12:07

## 2025-08-05 ASSESSMENT — ACTIVITIES OF DAILY LIVING (ADL)
ADLS_ACUITY_SCORE: 31
ADLS_ACUITY_SCORE: 31
ADLS_ACUITY_SCORE: 33
ADLS_ACUITY_SCORE: 31
ADLS_ACUITY_SCORE: 33
ADLS_ACUITY_SCORE: 31
ADLS_ACUITY_SCORE: 33
IADL_COMMENTS: FAMILY WILL DO UNTIL PT IS RECOVERED
ADLS_ACUITY_SCORE: 31
ADLS_ACUITY_SCORE: 31
ADLS_ACUITY_SCORE: 33

## (undated) DEVICE — KIT PATIENT CARE HANA TABLE PROFX SUPINE 1000

## (undated) DEVICE — HOOD SURG T7PLUS PEEL AWAY FACE SHIELD STRL LF 0416-801-100

## (undated) DEVICE — CUSTOM PACK ANTERIOR HIP SOP5BAHHED

## (undated) DEVICE — SUCTION MANIFOLD NEPTUNE 2 SYS 4 PORT 0702-020-000

## (undated) DEVICE — SU DERMABOND PRINEO 22CM CLR222US

## (undated) DEVICE — SUTURE STRATAFIX PDS 1 CTX 45CM

## (undated) DEVICE — GLOVE SURG PI ULTRA TOUCH M SZ 8-1/2 LF

## (undated) DEVICE — GOWN IMPERVIOUS BREATHABLE SMART XLG 89045

## (undated) DEVICE — SU FIBERWIRE 2 38" T-8 NDL  AR-7206

## (undated) DEVICE — SUTURE VICRYL+ 2-0 27IN CT-1 UND VCP259H

## (undated) DEVICE — NEEDLE HYPO 21GA X 1-1/2 SAFETY 305917

## (undated) DEVICE — DRSG FOAM MEPILEX BORDER SILVER 8X4 POSTOP 498400

## (undated) DEVICE — GLOVE UNDER INDICATOR PI SZ 8.5 LF 41685

## (undated) DEVICE — HOOD SURG T7 PLUS STRL LF DISP 0416-801-200

## (undated) DEVICE — BLADE SAGITTAL WIDE (SO-618) 2108-118

## (undated) DEVICE — ELECTRODE PATIENT RETURN ADULT L10 FT 2 PLATE CORD 0855C

## (undated) DEVICE — SOLUTION WATER 1000ML BOTTLE R5000-01

## (undated) DEVICE — PULSE LAVAGE IRRIGATION SYSTEM 0210-114-100

## (undated) DEVICE — GLOVE BIOGEL PI INDICATOR 8.0 LF 41680

## (undated) DEVICE — GLOVE BIOGEL PI ULTRATOUCH G SZ 8.0 42180

## (undated) DEVICE — SUTURE MONOCRYL 3-0 18 PS2 UND MCP497G

## (undated) DEVICE — SOLUTION IRRIGATION 0.9% NACL 1000ML BOTTLE R5200-01

## (undated) DEVICE — MAT FLOOR WATERPROOF BACKSHEET FMBP30

## (undated) DEVICE — SOLUTION IV 0.9% NACL 1000ML E8000

## (undated) RX ORDER — FENTANYL CITRATE 50 UG/ML
INJECTION, SOLUTION INTRAMUSCULAR; INTRAVENOUS
Status: DISPENSED
Start: 2025-08-04

## (undated) RX ORDER — DEXAMETHASONE SODIUM PHOSPHATE 10 MG/ML
INJECTION, EMULSION INTRAMUSCULAR; INTRAVENOUS
Status: DISPENSED
Start: 2025-08-04

## (undated) RX ORDER — BUPIVACAINE HYDROCHLORIDE 7.5 MG/ML
INJECTION, SOLUTION INTRASPINAL
Status: DISPENSED
Start: 2025-08-04